# Patient Record
Sex: FEMALE | Race: WHITE | HISPANIC OR LATINO | Employment: FULL TIME | URBAN - METROPOLITAN AREA
[De-identification: names, ages, dates, MRNs, and addresses within clinical notes are randomized per-mention and may not be internally consistent; named-entity substitution may affect disease eponyms.]

---

## 2018-09-15 ENCOUNTER — HOSPITAL ENCOUNTER (EMERGENCY)
Facility: HOSPITAL | Age: 33
Discharge: HOME | End: 2018-09-16
Attending: EMERGENCY MEDICINE

## 2018-09-15 ENCOUNTER — APPOINTMENT (EMERGENCY)
Dept: RADIOLOGY | Facility: HOSPITAL | Age: 33
End: 2018-09-15

## 2018-09-15 VITALS
HEART RATE: 71 BPM | WEIGHT: 182 LBS | SYSTOLIC BLOOD PRESSURE: 115 MMHG | OXYGEN SATURATION: 99 % | HEIGHT: 66 IN | TEMPERATURE: 97.5 F | BODY MASS INDEX: 29.25 KG/M2 | RESPIRATION RATE: 18 BRPM | DIASTOLIC BLOOD PRESSURE: 55 MMHG

## 2018-09-15 DIAGNOSIS — M79.602 LEFT ARM PAIN: ICD-10-CM
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
## 2018-09-15 DIAGNOSIS — R07.89 CHEST WALL PAIN: ICD-10-CM

## 2018-09-15 DIAGNOSIS — N39.0 UTI (URINARY TRACT INFECTION): ICD-10-CM

## 2018-09-15 DIAGNOSIS — M54.9 BACK PAIN: ICD-10-CM

## 2018-09-15 DIAGNOSIS — M79.605 LEFT LEG PAIN: ICD-10-CM

## 2018-09-15 LAB
B-HCG SERPL-ACNC: <2 MIU/ML
BACTERIA UR QL AUTO: ABNORMAL /HPF
BILIRUB UR QL STRIP: NEGATIVE
CLARITY UR: CLEAR
COLOR UR: YELLOW
EXT PREG TEST URINE: ABNORMAL
GLUCOSE UR STRIP-MCNC: NEGATIVE MG/DL
HGB UR QL STRIP.AUTO: ABNORMAL
HYALINE CASTS #/AREA URNS LPF: ABNORMAL /LPF
KETONES UR STRIP-MCNC: ABNORMAL MG/DL
LEUKOCYTE ESTERASE UR QL STRIP: NEGATIVE
NITRITE UR QL STRIP: POSITIVE
NON-SQ EPI CELLS URNS QL MICRO: ABNORMAL /HPF
PH UR STRIP.AUTO: 5.5 [PH] (ref 4.5–8)
PROT UR STRIP-MCNC: ABNORMAL MG/DL
RBC #/AREA URNS AUTO: ABNORMAL /HPF
SP GR UR STRIP.AUTO: 1.02 (ref 1–1.03)
UROBILINOGEN UR QL STRIP.AUTO: 1 E.U./DL
WBC #/AREA URNS AUTO: ABNORMAL /HPF

## 2018-09-15 PROCEDURE — 93005 ELECTROCARDIOGRAM TRACING: CPT

## 2018-09-15 PROCEDURE — 81025 URINE PREGNANCY TEST: CPT | Performed by: EMERGENCY MEDICINE

## 2018-09-15 PROCEDURE — 72070 X-RAY EXAM THORAC SPINE 2VWS: CPT

## 2018-09-15 PROCEDURE — 71046 X-RAY EXAM CHEST 2 VIEWS: CPT

## 2018-09-15 PROCEDURE — 36415 COLL VENOUS BLD VENIPUNCTURE: CPT | Performed by: EMERGENCY MEDICINE

## 2018-09-15 PROCEDURE — 72100 X-RAY EXAM L-S SPINE 2/3 VWS: CPT

## 2018-09-15 PROCEDURE — 84702 CHORIONIC GONADOTROPIN TEST: CPT | Performed by: EMERGENCY MEDICINE

## 2018-09-15 PROCEDURE — 72125 CT NECK SPINE W/O DYE: CPT

## 2018-09-15 PROCEDURE — 81001 URINALYSIS AUTO W/SCOPE: CPT

## 2018-09-15 PROCEDURE — 70450 CT HEAD/BRAIN W/O DYE: CPT

## 2018-09-15 RX ORDER — KETOROLAC TROMETHAMINE 30 MG/ML
15 INJECTION, SOLUTION INTRAMUSCULAR; INTRAVENOUS ONCE
Status: COMPLETED | OUTPATIENT
Start: 2018-09-15 | End: 2018-09-16

## 2018-09-15 RX ORDER — KETOROLAC TROMETHAMINE 30 MG/ML
15 INJECTION, SOLUTION INTRAMUSCULAR; INTRAVENOUS ONCE
Status: DISCONTINUED | OUTPATIENT
Start: 2018-09-15 | End: 2018-09-15

## 2018-09-15 RX ORDER — NITROFURANTOIN 25; 75 MG/1; MG/1
100 CAPSULE ORAL 2 TIMES DAILY
Qty: 10 CAPSULE | Refills: 0 | Status: SHIPPED | OUTPATIENT
Start: 2018-09-15 | End: 2018-09-20

## 2018-09-16 LAB
ATRIAL RATE: 88 BPM
P AXIS: 58 DEGREES
PR INTERVAL: 144 MS
QRS AXIS: 60 DEGREES
QRSD INTERVAL: 82 MS
QT INTERVAL: 356 MS
QTC INTERVAL: 430 MS
T WAVE AXIS: 42 DEGREES
VENTRICULAR RATE: 88 BPM

## 2018-09-16 PROCEDURE — 96372 THER/PROPH/DIAG INJ SC/IM: CPT

## 2018-09-16 PROCEDURE — 93010 ELECTROCARDIOGRAM REPORT: CPT | Performed by: INTERNAL MEDICINE

## 2018-09-16 PROCEDURE — 99284 EMERGENCY DEPT VISIT MOD MDM: CPT

## 2018-09-16 RX ADMIN — KETOROLAC TROMETHAMINE 15 MG: 30 INJECTION, SOLUTION INTRAMUSCULAR at 00:01
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
## 2018-09-16 NOTE — ED NOTES
Spoke with Resident Garlan Osgood about pts positive preg test and asked if she would like a f/u HCG testing  Resident will speak with attending and inform nursing staff of the answer       Wing Hickman RN  09/15/18 3702

## 2018-12-28 ENCOUNTER — ANNUAL EXAM (OUTPATIENT)
Dept: OBGYN CLINIC | Facility: CLINIC | Age: 33
End: 2018-12-28
Payer: COMMERCIAL

## 2018-12-28 VITALS
WEIGHT: 180 LBS | BODY MASS INDEX: 28.93 KG/M2 | SYSTOLIC BLOOD PRESSURE: 112 MMHG | HEIGHT: 66 IN | DIASTOLIC BLOOD PRESSURE: 68 MMHG

## 2018-12-28 DIAGNOSIS — Z01.419 WOMEN'S ANNUAL ROUTINE GYNECOLOGICAL EXAMINATION: Primary | ICD-10-CM

## 2018-12-28 DIAGNOSIS — R10.2 PELVIC PAIN: ICD-10-CM

## 2018-12-28 PROCEDURE — 87624 HPV HI-RISK TYP POOLED RSLT: CPT | Performed by: NURSE PRACTITIONER

## 2018-12-28 PROCEDURE — G0145 SCR C/V CYTO,THINLAYER,RESCR: HCPCS | Performed by: NURSE PRACTITIONER

## 2018-12-28 PROCEDURE — 87480 CANDIDA DNA DIR PROBE: CPT | Performed by: NURSE PRACTITIONER

## 2018-12-28 PROCEDURE — 87660 TRICHOMONAS VAGIN DIR PROBE: CPT | Performed by: NURSE PRACTITIONER

## 2018-12-28 PROCEDURE — 99385 PREV VISIT NEW AGE 18-39: CPT | Performed by: NURSE PRACTITIONER

## 2018-12-28 PROCEDURE — 87510 GARDNER VAG DNA DIR PROBE: CPT | Performed by: NURSE PRACTITIONER

## 2018-12-28 RX ORDER — RANITIDINE 150 MG/1
TABLET ORAL 2 TIMES DAILY
COMMUNITY
Start: 2014-12-09 | End: 2019-03-11 | Stop reason: SDUPTHER

## 2018-12-28 RX ORDER — PHENTERMINE HYDROCHLORIDE 37.5 MG/1
37.5 CAPSULE ORAL EVERY MORNING
COMMUNITY
End: 2019-06-12 | Stop reason: ALTCHOICE

## 2018-12-28 RX ORDER — FLUTICASONE PROPIONATE 50 MCG
2 SPRAY, SUSPENSION (ML) NASAL DAILY
COMMUNITY
Start: 2014-12-09 | End: 2019-03-11

## 2018-12-28 NOTE — PROGRESS NOTES
Subjective  HPI:     Elayne Busby is a 29 y o  female  She is a  4 Para 3, with 3 prior  section  Her menstrual cycles are regular and predictable, heavy with severe cramping  She takes Naproxen 500 mg  This is effective  Her current method of contraception includes tubal ligation  She complains of pelvic pain with intercourse and when she is not sexually active  She denies /GI and Gyn complaints  She complains of depression/anxiety which she manages by keeping her self active  Medical, surgical and family history reviewed  Her dental care is up-to-date  She eats a healthy diet and exercises regularly  She has lost about 30 lbs on phentermine  She is happy with her weight  Gynecologic History    No LMP recorded  Last Pap: 10/2017  Results were: normal - per patient  Obstetric History    OB History    Para Term  AB Living   4 3     1 3   SAB TAB Ectopic Multiple Live Births     1     3      # Outcome Date GA Lbr Luciano/2nd Weight Sex Delivery Anes PTL Lv   4 TAB            3 Para            2 Para            1 Para                   The following portions of the patient's history were reviewed and updated as appropriate: allergies, current medications, past family history, past medical history, past social history, past surgical history and problem list     Review of Systems    Pertinent items are noted in HPI  Objective    Physical Exam   Constitutional: Vital signs are normal  She appears well-developed and well-nourished  Genitourinary: Vagina normal and uterus normal  Pelvic exam was performed with patient supine  There is no rash, tenderness, lesion or Bartholin's cyst on the right labia  There is no rash, tenderness, lesion or Bartholin's cyst on the left labia  Right adnexum does not display mass, does not display tenderness and does not display fullness  Left adnexum does not display mass, does not display tenderness and does not display fullness     Cervix is not parous  Cervix does not exhibit motion tenderness, lesion, discharge, friability, polyp or nabothian cyst      Uterus is anteverted  HENT:   Head: Normocephalic and atraumatic  Neck: Neck supple  No thyromegaly present  Cardiovascular: Normal rate, regular rhythm, S1 normal, S2 normal and normal heart sounds  Pulmonary/Chest: Effort normal and breath sounds normal  Right breast exhibits no inverted nipple, no mass, no nipple discharge, no skin change and no tenderness  Left breast exhibits no inverted nipple, no mass, no nipple discharge, no skin change and no tenderness  Abdominal: Soft  Normal appearance and bowel sounds are normal  She exhibits no distension and no mass  There is tenderness (on bimanual palpation  ) in the suprapubic area  There is no guarding  Lymphadenopathy:     She has no cervical adenopathy  She has no axillary adenopathy  Neurological: She is alert  Skin: Skin is warm  Psychiatric: She has a normal mood and affect  Nursing note and vitals reviewed  Assessment and Plan    Gómez was seen today for gynecologic exam     Diagnoses and all orders for this visit:    Women's annual routine gynecological examination  -     Liquid-based pap, screening  -     VAGINOSIS DNA PROBE (AFFIRM)    Pelvic pain  -     US pelvis complete non OB; Future      Patient informed of a Stable GYN exam  A pap smear was performed  I have discussed the importance of exercise and healthy diet as well as adequate intake of calcium and vitamin D  The current ASCCP guidelines were reviewed  The current ASCCP guidelines were reviewed  The low risk patient will receive pap smear screening every 3 years until the age of 34 and then every 3 to 5 years with HPV co-testing from the ages of 33-67  I emphasized the importance of an annual pelvic and breast exam  A yearly mammogram is recommended for breast cancer screening starting at age 36       Will call patient with results when they are available  All questions have been answered to her satisfaction  Follow up in: 1 year or sooner if needed

## 2018-12-30 LAB
CANDIDA RRNA VAG QL PROBE: NEGATIVE
G VAGINALIS RRNA GENITAL QL PROBE: POSITIVE
T VAGINALIS RRNA GENITAL QL PROBE: NEGATIVE

## 2018-12-31 ENCOUNTER — TELEPHONE (OUTPATIENT)
Dept: OBGYN CLINIC | Facility: CLINIC | Age: 33
End: 2018-12-31

## 2018-12-31 DIAGNOSIS — B96.89 BACTERIAL VAGINOSIS: Primary | ICD-10-CM

## 2018-12-31 DIAGNOSIS — N76.0 BACTERIAL VAGINOSIS: Primary | ICD-10-CM

## 2018-12-31 RX ORDER — METRONIDAZOLE 500 MG/1
500 TABLET ORAL EVERY 12 HOURS SCHEDULED
Qty: 14 TABLET | Refills: 0 | Status: SHIPPED | OUTPATIENT
Start: 2018-12-31 | End: 2019-01-07

## 2018-12-31 NOTE — TELEPHONE ENCOUNTER
Patient informed of + BV on culture  Flagyl sent to pharmacy  All questions and concerns addressed and patient verbalized understanding

## 2019-01-01 LAB
HPV HR 12 DNA CVX QL NAA+PROBE: NEGATIVE
HPV16 DNA CVX QL NAA+PROBE: NEGATIVE
HPV18 DNA CVX QL NAA+PROBE: NEGATIVE

## 2019-01-02 LAB
LAB AP GYN PRIMARY INTERPRETATION: NORMAL
Lab: NORMAL

## 2019-01-04 ENCOUNTER — TRANSCRIBE ORDERS (OUTPATIENT)
Dept: RADIOLOGY | Facility: HOSPITAL | Age: 34
End: 2019-01-04

## 2019-01-04 ENCOUNTER — HOSPITAL ENCOUNTER (OUTPATIENT)
Dept: RADIOLOGY | Facility: HOSPITAL | Age: 34
Discharge: HOME | End: 2019-01-04
Payer: COMMERCIAL

## 2019-01-04 DIAGNOSIS — R10.2 PELVIC PAIN: ICD-10-CM

## 2019-01-04 PROCEDURE — 76830 TRANSVAGINAL US NON-OB: CPT

## 2019-01-04 PROCEDURE — 76856 US EXAM PELVIC COMPLETE: CPT

## 2019-01-18 ENCOUNTER — TELEPHONE (OUTPATIENT)
Dept: OBGYN CLINIC | Facility: CLINIC | Age: 34
End: 2019-01-18

## 2019-02-17 ENCOUNTER — HOSPITAL ENCOUNTER (EMERGENCY)
Facility: HOSPITAL | Age: 34
Discharge: HOME | End: 2019-02-17
Attending: EMERGENCY MEDICINE | Admitting: EMERGENCY MEDICINE
Payer: COMMERCIAL

## 2019-02-17 VITALS
OXYGEN SATURATION: 97 % | HEART RATE: 96 BPM | DIASTOLIC BLOOD PRESSURE: 60 MMHG | RESPIRATION RATE: 18 BRPM | SYSTOLIC BLOOD PRESSURE: 117 MMHG | TEMPERATURE: 98.9 F

## 2019-02-17 DIAGNOSIS — R68.89 FLU-LIKE SYMPTOMS: Primary | ICD-10-CM

## 2019-02-17 DIAGNOSIS — B34.9 VIRAL SYNDROME: ICD-10-CM

## 2019-02-17 LAB — EXT PREG TEST URINE: NEGATIVE

## 2019-02-17 PROCEDURE — 99283 EMERGENCY DEPT VISIT LOW MDM: CPT

## 2019-02-17 PROCEDURE — 81025 URINE PREGNANCY TEST: CPT | Performed by: EMERGENCY MEDICINE

## 2019-02-17 RX ORDER — ONDANSETRON 4 MG/1
4 TABLET, ORALLY DISINTEGRATING ORAL ONCE
Status: COMPLETED | OUTPATIENT
Start: 2019-02-17 | End: 2019-02-17

## 2019-02-17 RX ORDER — OXYMETAZOLINE HYDROCHLORIDE 0.05 G/100ML
2 SPRAY NASAL ONCE
Status: COMPLETED | OUTPATIENT
Start: 2019-02-17 | End: 2019-02-17

## 2019-02-17 RX ORDER — ONDANSETRON 4 MG/1
4 TABLET, ORALLY DISINTEGRATING ORAL EVERY 6 HOURS PRN
Qty: 20 TABLET | Refills: 0 | Status: SHIPPED | OUTPATIENT
Start: 2019-02-17 | End: 2019-10-31 | Stop reason: ALTCHOICE

## 2019-02-17 RX ORDER — IBUPROFEN 600 MG/1
600 TABLET ORAL ONCE
Status: COMPLETED | OUTPATIENT
Start: 2019-02-17 | End: 2019-02-17

## 2019-02-17 RX ADMIN — ONDANSETRON 4 MG: 4 TABLET, ORALLY DISINTEGRATING ORAL at 15:45

## 2019-02-17 RX ADMIN — OXYMETAZOLINE HYDROCHLORIDE 2 SPRAY: 0.05 SPRAY NASAL at 15:44

## 2019-02-17 RX ADMIN — IBUPROFEN 600 MG: 600 TABLET ORAL at 15:45

## 2019-02-17 NOTE — ED PROVIDER NOTES
History  Chief Complaint   Patient presents with    Flu Symptoms     Pt reports 2 days of cough, nasal congestion, ear pain, sore throat and subjective fever, started yesterday with vomiting and diarrhea     This is a 81-year-old female with a history of asthma who presents with viral URI type symptoms  Starting Friday, she began experiencing runny nose, cough, subjective fevers, sore throat  She has taken Mucinex and Tylenol without relief  Over the past couple days she has also experienced 4 episodes of nonbloody, nonmelanotic diarrhea  Starting today, she had 2 episodes of nonbloody, nonbilious vomiting  States that she has been around a few family members with similar symptoms  Unsure if she received the flu vaccine this year  Denies lightheadedness/dizziness, numbness/weakness, headache, change in vision, neck pain, chest pain, palpitations, shortness of breath, back pain, flank pain, abdominal pain,  hematochezia, melena, dysuria, hematuria, abnormal vaginal discharge/bleeding  Prior to Admission Medications   Prescriptions Last Dose Informant Patient Reported?  Taking?   fluticasone (FLONASE) 50 mcg/act nasal spray   Yes No   Si sprays into each nostril daily   phentermine 37 5 MG capsule  Self Yes No   Sig: Take 37 5 mg by mouth every morning   ranitidine (ZANTAC) 150 mg tablet   Yes No   Sig: Take by mouth 2 (two) times a day      Facility-Administered Medications: None       Past Medical History:   Diagnosis Date    Asthma     Migraine        Past Surgical History:   Procedure Laterality Date     SECTION      x 3    TUBAL LIGATION         Family History   Problem Relation Age of Onset    Asthma Mother    Jessenia Courser Migraines Mother     Osteoarthritis Mother     No Known Problems Father     No Known Problems Sister     No Known Problems Brother     Asthma Daughter     Asthma Son     Diabetes Maternal Grandmother     Heart failure Maternal Grandmother     Arthritis Maternal Grandmother      I have reviewed and agree with the history as documented  Social History     Tobacco Use    Smoking status: Never Smoker    Smokeless tobacco: Never Used   Substance Use Topics    Alcohol use: Yes     Comment: social    Drug use: No        Review of Systems   Constitutional: Positive for fever  Negative for chills  HENT: Positive for congestion, rhinorrhea and sore throat  Negative for trouble swallowing  Eyes: Negative for photophobia and visual disturbance  Respiratory: Positive for cough  Negative for chest tightness and shortness of breath  Cardiovascular: Negative for chest pain, palpitations and leg swelling  Gastrointestinal: Positive for diarrhea, nausea and vomiting  Negative for abdominal pain and blood in stool  Endocrine: Negative for polyuria  Genitourinary: Negative for dysuria, flank pain, hematuria, vaginal bleeding and vaginal discharge  Musculoskeletal: Negative for back pain and neck pain  Skin: Negative for color change and rash  Allergic/Immunologic: Negative for immunocompromised state  Neurological: Negative for dizziness, weakness, light-headedness, numbness and headaches  All other systems reviewed and are negative  Physical Exam  ED Triage Vitals   Temperature Pulse Respirations Blood Pressure SpO2   02/17/19 1401 02/17/19 1401 02/17/19 1401 02/17/19 1401 02/17/19 1401   98 9 °F (37 2 °C) 96 18 117/60 97 %      Temp Source Heart Rate Source Patient Position - Orthostatic VS BP Location FiO2 (%)   02/17/19 1401 02/17/19 1401 02/17/19 1401 02/17/19 1401 --   Tympanic Monitor Sitting Left arm       Pain Score       02/17/19 1402       9           Orthostatic Vital Signs  Vitals:    02/17/19 1401   BP: 117/60   Pulse: 96   Patient Position - Orthostatic VS: Sitting       Physical Exam   Constitutional: Vital signs are normal  She appears well-developed  She is cooperative  No distress  HENT:   Nose: Rhinorrhea present     Mouth/Throat: Uvula is midline, oropharynx is clear and moist and mucous membranes are normal  No tonsillar exudate  Eyes: Pupils are equal, round, and reactive to light  Conjunctivae and EOM are normal    Neck: Trachea normal  No thyroid mass and no thyromegaly present  Cardiovascular: Normal rate, regular rhythm, normal heart sounds, intact distal pulses and normal pulses  No murmur heard  Pulmonary/Chest: Effort normal and breath sounds normal    Abdominal: Soft  Normal appearance and bowel sounds are normal  There is no tenderness  There is no rebound, no guarding and no CVA tenderness  Neurological: She is alert  Skin: Skin is warm, dry and intact  Psychiatric: She has a normal mood and affect  Her speech is normal and behavior is normal  Thought content normal        ED Medications  Medications   oxymetazoline (AFRIN) 0 05 % nasal spray 2 spray (2 sprays Each Nare Given 2/17/19 1544)   ondansetron (ZOFRAN-ODT) dispersible tablet 4 mg (4 mg Oral Given 2/17/19 1545)   ibuprofen (MOTRIN) tablet 600 mg (600 mg Oral Given 2/17/19 1545)       Diagnostic Studies  Results Reviewed     Procedure Component Value Units Date/Time    POCT pregnancy, urine [[id number]]  (Normal) Resulted:  02/17/19 1521    Lab Status:  Final result Updated:  02/17/19 1521     EXT PREG TEST UR (Ref: Negative) negative                 No orders to display         Procedures  Procedures      Phone Consults  ED Phone Contact    ED Course                               MDM  Number of Diagnoses or Management Options  Diagnosis management comments: The patient has a viral URI  Will treat symptomatically with Afrin, Zofran, and Motrin  Work note for the next couple of days  Strict return precautions given        Disposition  Final diagnoses:   Flu-like symptoms   Viral syndrome     Time reflects when diagnosis was documented in both MDM as applicable and the Disposition within this note     Time User Action Codes Description Comment    2/17/2019 3:40 PM Ilya Pugh Add [R68 89] Flu-like symptoms     2/17/2019  3:40 PM Robertia Cora BEAR Add [B34 9] Viral syndrome       ED Disposition     ED Disposition Condition Date/Time Comment    Discharge Stable Sun Feb 17, 2019  3:40 PM Blade Steinberg discharge to home/self care  Follow-up Information     Follow up With Specialties Details Why Contact Info Additional Information    Mauro Garcia MD Family Medicine Schedule an appointment as soon as possible for a visit  As needed [place] [phone]       [address] [place] [address] Emergency Department Emergency Medicine Go to  If symptoms worsen [address]  [phone]  ED, [address] [place] [address], [facility]          Patient's Medications   Discharge Prescriptions    ONDANSETRON (ZOFRAN-ODT) 4 MG DISINTEGRATING TABLET    Take 1 tablet (4 mg total) by mouth every 6 (six) hours as needed for nausea or vomiting       Start Date: 2/17/2019 End Date: --       Order Dose: 4 mg       Quantity: 20 tablet    Refills: 0     No discharge procedures on file  ED Provider  Attending physically available and evaluated Blade Steinberg I managed the patient along with the ED Attending      Electronically Signed by         Judge Rosalio MD  02/17/19 6018

## 2019-02-17 NOTE — ED ATTENDING ATTESTATION
Abigail Sauer DO, saw and evaluated the patient  I have discussed the patient with the resident/non-physician practitioner and agree with the resident's/non-physician practitioner's findings, Plan of Care, and MDM as documented in the resident's/non-physician practitioner's note, except where noted  All available labs and Radiology studies were reviewed  At this point I agree with the current assessment done in the Emergency Department  I have conducted an independent evaluation of this patient a history and physical is as follows:      28 yo female c/o subjective fever, rhinorrhea, cough, sore throat, n/v/d over past few days  +sick contacts  Symptoms generalized, moderate severity, no a/e factors  Unsure if she had a flu vaccine this year       Imp: multiple c/o likely viral URI plan: symptomatic tx    Critical Care Time  Procedures

## 2019-03-11 ENCOUNTER — OFFICE VISIT (OUTPATIENT)
Dept: INTERNAL MEDICINE CLINIC | Facility: CLINIC | Age: 34
End: 2019-03-11

## 2019-03-11 VITALS
SYSTOLIC BLOOD PRESSURE: 110 MMHG | TEMPERATURE: 97.7 F | HEIGHT: 65 IN | WEIGHT: 182.98 LBS | HEART RATE: 100 BPM | BODY MASS INDEX: 30.49 KG/M2 | DIASTOLIC BLOOD PRESSURE: 76 MMHG

## 2019-03-11 DIAGNOSIS — J45.30 MILD PERSISTENT ASTHMA WITHOUT COMPLICATION: ICD-10-CM

## 2019-03-11 DIAGNOSIS — M54.50 CHRONIC BILATERAL LOW BACK PAIN WITHOUT SCIATICA: Primary | ICD-10-CM

## 2019-03-11 DIAGNOSIS — G89.29 CHRONIC BILATERAL LOW BACK PAIN WITHOUT SCIATICA: Primary | ICD-10-CM

## 2019-03-11 DIAGNOSIS — F41.9 ANXIETY: ICD-10-CM

## 2019-03-11 DIAGNOSIS — R10.13 DYSPEPSIA: ICD-10-CM

## 2019-03-11 DIAGNOSIS — E66.09 CLASS 1 OBESITY DUE TO EXCESS CALORIES WITHOUT SERIOUS COMORBIDITY WITH BODY MASS INDEX (BMI) OF 30.0 TO 30.9 IN ADULT: ICD-10-CM

## 2019-03-11 DIAGNOSIS — J30.89 SEASONAL ALLERGIC RHINITIS DUE TO OTHER ALLERGIC TRIGGER: ICD-10-CM

## 2019-03-11 PROBLEM — E66.811 CLASS 1 OBESITY DUE TO EXCESS CALORIES WITHOUT SERIOUS COMORBIDITY WITH BODY MASS INDEX (BMI) OF 30.0 TO 30.9 IN ADULT: Status: ACTIVE | Noted: 2019-03-11

## 2019-03-11 PROCEDURE — 99204 OFFICE O/P NEW MOD 45 MIN: CPT | Performed by: INTERNAL MEDICINE

## 2019-03-11 PROCEDURE — 3725F SCREEN DEPRESSION PERFORMED: CPT | Performed by: INTERNAL MEDICINE

## 2019-03-11 RX ORDER — RANITIDINE 150 MG/1
75 TABLET ORAL 2 TIMES DAILY PRN
Qty: 60 TABLET | Refills: 0 | Status: SHIPPED | OUTPATIENT
Start: 2019-03-11 | End: 2021-06-17

## 2019-03-11 RX ORDER — ALBUTEROL SULFATE 90 UG/1
2 AEROSOL, METERED RESPIRATORY (INHALATION) EVERY 6 HOURS PRN
Qty: 18 G | Refills: 0 | Status: SHIPPED | OUTPATIENT
Start: 2019-03-11 | End: 2019-10-31 | Stop reason: SDUPTHER

## 2019-03-11 RX ORDER — LORATADINE 10 MG/1
10 TABLET ORAL DAILY
Qty: 90 TABLET | Refills: 1 | Status: SHIPPED | OUTPATIENT
Start: 2019-03-11 | End: 2019-09-07

## 2019-03-11 RX ORDER — METHOCARBAMOL 750 MG/1
750 TABLET, FILM COATED ORAL 2 TIMES DAILY PRN
Qty: 30 TABLET | Refills: 0 | Status: SHIPPED | OUTPATIENT
Start: 2019-03-11 | End: 2021-01-27

## 2019-03-11 RX ORDER — FLUTICASONE PROPIONATE 50 MCG
1 SPRAY, SUSPENSION (ML) NASAL DAILY
Qty: 1 BOTTLE | Refills: 1 | Status: SHIPPED | OUTPATIENT
Start: 2019-03-11 | End: 2020-02-19 | Stop reason: ALTCHOICE

## 2019-03-11 NOTE — PROGRESS NOTES
Assessment/Plan:    No problem-specific Assessment & Plan notes found for this encounter  Diagnoses and all orders for this visit:    Chronic bilateral low back pain without sciatica  -     methocarbamol (ROBAXIN) 750 mg tablet; Take 1 tablet (750 mg total) by mouth 2 (two) times a day as needed for muscle spasms for up to 15 days  -     XR entire spine (scoliosis) 4-5 vw; Future    Mild persistent asthma without complication  -     mometasone-formoterol (DULERA) 100-5 MCG/ACT inhaler; Inhale 2 puffs 2 (two) times a day for 90 days Rinse mouth after use  -     albuterol (VENTOLIN HFA) 90 mcg/act inhaler; Inhale 2 puffs every 6 (six) hours as needed for wheezing  -     loratadine (CLARITIN) 10 mg tablet; Take 1 tablet (10 mg total) by mouth daily for 180 days    Seasonal allergic rhinitis due to other allergic trigger  -     loratadine (CLARITIN) 10 mg tablet; Take 1 tablet (10 mg total) by mouth daily for 180 days  -     fluticasone (FLONASE) 50 mcg/act nasal spray; 1 spray into each nostril daily for 240 days    Anxiety  -     TSH, 3rd generation with Free T4 reflex    Class 1 obesity due to excess calories without serious comorbidity with body mass index (BMI) of 30 0 to 30 9 in adult  -     Comprehensive metabolic panel  -     Lipid Panel with Direct LDL reflex  -     Ambulatory referral to Weight Management; Future    Dyspepsia  -     ranitidine (ZANTAC) 150 mg tablet; Take 0 5 tablets (75 mg total) by mouth 2 (two) times a day as needed for heartburn for up to 30 days          Subjective:      Patient ID: Víctor Rogers is a 29 y o  female  New patient to establish care  Asthma since child only has a rescue using it every day  Also chronic allergies    Last PCP was in [place], was being given diet pill and gabapentin for back pain  Just ran out of meds            Back Pain   This is a chronic problem  The current episode started more than 1 year ago  The problem occurs every several days   The problem has been gradually worsening since onset  The pain is present in the lumbar spine  The quality of the pain is described as aching  The pain does not radiate  The pain is at a severity of 7/10  The pain is moderate  The pain is worse during the day  The symptoms are aggravated by bending and standing  Pertinent negatives include no abdominal pain, bladder incontinence, bowel incontinence, chest pain, dysuria, fever, leg pain, numbness, paresthesias, tingling or weakness  She has tried NSAIDs (motrin helps for a little while) for the symptoms  The treatment provided mild relief  Asthma   She complains of shortness of breath  There is no cough, hoarse voice or wheezing  This is a chronic problem  The current episode started more than 1 year ago  The problem occurs every several days  The problem has been gradually worsening  Associated symptoms include heartburn, myalgias, nasal congestion, postnasal drip, rhinorrhea and sneezing  Pertinent negatives include no chest pain, ear congestion, ear pain, fever, sore throat or trouble swallowing  Her symptoms are aggravated by change in weather, pollen and URI  Her symptoms are alleviated by beta-agonist  She reports moderate improvement on treatment  Her past medical history is significant for asthma  Heartburn   She complains of heartburn  She reports no abdominal pain, no chest pain, no choking, no coughing, no dysphagia, no hoarse voice, no nausea, no sore throat or no wheezing  This is a chronic problem  The current episode started more than 1 year ago  The problem occurs frequently  The problem has been gradually worsening  The heartburn duration is an hour  The heartburn is located in the substernum and LUQ  The heartburn is of moderate intensity  The heartburn does not wake her from sleep  The heartburn does not limit her activity  The heartburn doesn't change with position  Pertinent negatives include no fatigue  Risk factors include NSAIDs and lack of exercise  She has tried a histamine-2 antagonist for the symptoms  The treatment provided moderate relief  The following portions of the patient's history were reviewed and updated as appropriate: allergies, current medications, past family history, past medical history, past social history, past surgical history and problem list     Review of Systems   Constitutional: Negative  Negative for chills, fatigue, fever and unexpected weight change  HENT: Positive for congestion, postnasal drip, rhinorrhea and sneezing  Negative for dental problem, ear pain, hoarse voice, sore throat and trouble swallowing  Dentist 2018   Eyes: Negative  No correction   Respiratory: Positive for shortness of breath  Negative for cough, choking, chest tightness and wheezing  Cardiovascular: Negative  Negative for chest pain, palpitations and leg swelling  Gastrointestinal: Positive for heartburn  Negative for abdominal pain, bowel incontinence, dysphagia and nausea  Endocrine: Negative  Genitourinary: Negative  Negative for bladder incontinence and dysuria  3 children /  states birth canal too narrow  2 abortions    No gestational DM  Elevated BP all 3 was on BP meds with second child    Last PAP 2018   Musculoskeletal: Positive for back pain and myalgias  Fractured R arm small child/ cast   Skin: Negative  Negative for rash  No eczema or psoriasis   Allergic/Immunologic: Positive for environmental allergies  Neurological: Negative  Negative for tingling, seizures, weakness, numbness and paresthesias  Psychiatric/Behavioral: The patient is nervous/anxious  Used to see MH has not seen in about 5 years    Would like to go back  Feeling more anxious and also states has OCD         Objective:      /76 (BP Location: Left arm, Patient Position: Sitting, Cuff Size: Standard)   Pulse 100   Temp 97 7 °F (36 5 °C) (Oral)   Ht 5' 5" (1 651 m)   Wt 83 kg (182 lb 15 7 oz)   LMP 02/10/2019 (Approximate)   BMI 30 45 kg/m²          Physical Exam   Constitutional: She is oriented to person, place, and time  She appears well-developed and well-nourished  HENT:   Head: Normocephalic and atraumatic  Left Ear: External ear normal    Mouth/Throat: Oropharynx is clear and moist    Eyes: Pupils are equal, round, and reactive to light  Conjunctivae and EOM are normal    Neck: Normal range of motion  Neck supple  No thyromegaly present  Cardiovascular: Normal rate and regular rhythm  No murmur heard  Pulmonary/Chest: Effort normal and breath sounds normal  She has no wheezes  Abdominal: Soft  Bowel sounds are normal  There is no tenderness  Musculoskeletal: She exhibits tenderness  Lumbar back: She exhibits decreased range of motion, tenderness and deformity  She exhibits no bony tenderness  On physical exam it is noticed that patient's stands more with weight on right hip  Patient states she feels that she is always done this and feels that it is more comfortable  When asked about her spine she states she was supposed to have a brace as a child but never had 1  On exam hard to tell if there is a definitive scoliosis as patient continuously has right hip more lateral    Neurological: She is alert and oriented to person, place, and time  She displays normal reflexes  No sensory deficit  Skin: Skin is warm and dry  No rash noted  Tattoos lower back   Psychiatric: She has a normal mood and affect   Her behavior is normal  Judgment and thought content normal        PHQ-9 Depression Screening    PHQ-9:    Frequency of the following problems over the past two weeks:       Little interest or pleasure in doing things:  2 - more than half the days  Feeling down, depressed, or hopeless:  1 - several days  Trouble falling or staying asleep, or sleeping too much:  3 - nearly every day  Feeling tired or having little energy:  3 - nearly every day  Poor appetite or overeating: 2 - more than half the days  Feeling bad about yourself - or that you are a failure or have let yourself or your family down:  2 - more than half the days  Trouble concentrating on things, such as reading the newspaper or watching television:  2 - more than half the days  Moving or speaking so slowly that other people could have noticed  Or the opposite - being so fidgety or restless that you have been moving around a lot more than usual:  1 - several days  Thoughts that you would be better off dead, or of hurting yourself in some way:  0 - not at all  PHQ-2 Score:  3  PHQ-9 Score:  16       BMI Counseling: Body mass index is 30 45 kg/m²  Discussed the patient's BMI with her  The BMI is above average  BMI counseling and education was provided to the patient  Nutrition recommendations include reducing portion sizes, 3-5 servings of fruits/vegetables daily, reducing fast food intake, decreasing soda and/or juice intake and moderation in carbohydrate intake  Exercise recommendations include exercising 3-5 times per week

## 2019-03-11 NOTE — PATIENT INSTRUCTIONS
Please get the labs completed  As discussed we are starting you on a daily inhaler  This is because your asthma is not controlled  We reviewed that using a rescue inhaler 1-2 times every day means asthma is not well controlled  Please start the [facility]  This is twice a day every day as reviewed and please rinse her mouth after using  Allergy medications were also sent  I sent a medication for muscle spasms in her back  Please get the x-ray completed as you may have scoliosis and based on this will determine if you need to see a specialist or physical therapy for your chronic back pain  I also refilled her medication for stomach pain but we also reviewed the dietary changes as well as avoiding ibuprofen and Motrin as these also contribute to your stomach pain  Please also read the additional information included about diet and lifestyle for stomach pain  Please contact your insurance to get an appointment with a mental health provider  You plan to contact insurance possibly return to [facility] placed for weight management  At follow-up we will review your asthma as well as get breathing test completed in the office  Diet for Stomach Ulcers and Gastritis   WHAT YOU NEED TO KNOW:   A diet for stomach ulcers and gastritis is a meal plan that limits foods that irritate your stomach  Certain foods may worsen symptoms such as stomach pain, bloating, heartburn, or indigestion  DISCHARGE INSTRUCTIONS:   Foods to limit or avoid:  You may need to avoid acidic, spicy, or high-fat foods  Not all foods affect everyone the same way  You will need to learn which foods worsen your symptoms and limit those foods   The following are some foods that may worsen ulcer or gastritis symptoms:  · Beverages:      ¨ Whole milk and chocolate milk    ¨ Hot cocoa and cola    ¨ Any beverage with caffeine    ¨ Regular and decaffeinated coffee    ¨ Peppermint and spearmint tea    ¨ Green and black tea, with or without caffeine    ¨ Orange and grapefruit juices    ¨ Drinks that contain alcohol    · Spices and seasonings:      ¨ Black and red pepper    ¨ Chili powder    ¨ Mustard seed and nutmeg    · Other foods:      ¨ Dairy foods made from whole milk or cream    ¨ Chocolate    ¨ Spicy or strongly flavored cheeses, such as jalapeno or black pepper    ¨ Highly seasoned, high-fat meats, such as sausage, salami, banks, ham, and cold cuts    ¨ Hot chiles and peppers    ¨ Tomato products, such as tomato paste, tomato sauce, or tomato juice  Foods to include:  Eat a variety of healthy foods from all the food groups  Eat fruits, vegetables, whole grains, and fat-free or low-fat dairy foods  Whole grains include whole-wheat breads, cereals, pasta, and brown rice  Choose lean meats, poultry (chicken and turkey), fish, beans, eggs, and nuts  A healthy meal plan is low in unhealthy fats, salt, and added sugar  Healthy fats include olive oil and canola oil  Ask your dietitian for more information about a healthy diet  Other helpful guidelines:   · Do not eat right before bedtime  Stop eating at least 2 hours before bedtime  · Eat small, frequent meals  Your stomach may tolerate small, frequent meals better than large meals  © 2017 [address] [employer] Information is for End User's use only and may not be sold, redistributed or otherwise used for commercial purposes  All illustrations and images included in CareNotes® are the copyrighted property of A D A M [employer] Inc  or Josr Bowen  The above information is an  only  It is not intended as medical advice for individual conditions or treatments  Talk to your doctor, nurse or pharmacist before following any medical regimen to see if it is safe and effective for you  Obesity   AMBULATORY CARE:   Obesity  is when your body mass index (BMI) is greater than 30  Your healthcare provider will use your height and weight to measure your BMI    The risks of obesity include  many health problems, such as injuries or physical disability  You may need tests to check for the following:  · Diabetes     · High blood pressure or high cholesterol     · Heart disease     · Gallbladder or liver disease     · Cancer of the colon, breast, prostate, liver, or kidney     · Sleep apnea     · Arthritis or gout  Seek care immediately if:   · You have a severe headache, confusion, or difficulty speaking  · You have weakness on one side of your body  · You have chest pain, sweating, or shortness of breath  Contact your healthcare provider if:   · You have symptoms of gallbladder or liver disease, such as pain in your upper abdomen  · You have knee or hip pain and discomfort while walking  · You have symptoms of diabetes, such as intense hunger and thirst, and frequent urination  · You have symptoms of sleep apnea, such as snoring or daytime sleepiness  · You have questions or concerns about your condition or care  Treatment for obesity  focuses on helping you lose weight to improve your health  Even a small decrease in BMI can reduce the risk for many health problems  Your healthcare provider will help you set a weight-loss goal   · Lifestyle changes  are the first step in treating obesity  These include making healthy food choices and getting regular physical activity  Your healthcare provider may suggest a weight-loss program that involves coaching, education, and therapy  · Medicine  may help you lose weight when it is used with a healthy diet and physical activity  · Surgery  can help you lose weight if you are very obese and have other health problems  There are several types of weight-loss surgery  Ask your healthcare provider for more information  Be successful losing weight:   · Set small, realistic goals  An example of a small goal is to walk for 20 minutes 5 days a week  Anther goal is to lose 5% of your body weight      · Tell friends, family members, and coworkers about your goals  and ask for their support  Ask a friend to lose weight with you, or join a weight-loss support group  · Identify foods or triggers that may cause you to overeat , and find ways to avoid them  Remove tempting high-calorie foods from your home and workplace  Place a bowl of fresh fruit on your kitchen counter  If stress causes you to eat, then find other ways to cope with stress  · Keep a diary to track what you eat and drink  Also write down how many minutes of physical activity you do each day  Weigh yourself once a week and record it in your diary  Eating changes: You will need to eat 500 to 1,000 fewer calories each day than you currently eat to lose 1 to 2 pounds a week  The following changes will help you cut calories:  · Eat smaller portions  Use small plates, no larger than 9 inches in diameter  Fill your plate half full of fruits and vegetables  Measure your food using measuring cups until you know what a serving size looks like  · Eat 3 meals and 1 or 2 snacks each day  Plan your meals in advance  Rina Corona and eat at home most of the time  Eat slowly  · Eat fruits and vegetables at every meal   They are low in calories and high in fiber, which makes you feel full  Do not add butter, margarine, or cream sauce to vegetables  Use herbs to season steamed vegetables  · Eat less fat and fewer fried foods  Eat more baked or grilled chicken and fish  These protein sources are lower in calories and fat than red meat  Limit fast food  Dress your salads with olive oil and vinegar instead of bottled dressing  · Limit the amount of sugar you eat  Do not drink sugary beverages  Limit alcohol  Activity changes:  Physical activity is good for your body in many ways  It helps you burn calories and build strong muscles  It decreases stress and depression, and improves your mood  It can also help you sleep better   Talk to your healthcare provider before you begin an exercise program   · Exercise for at least 30 minutes 5 days a week  Start slowly  Set aside time each day for physical activity that you enjoy and that is convenient for you  It is best to do both weight training and an activity that increases your heart rate, such as walking, bicycling, or swimming  · Find ways to be more active  Do yard work and housecleaning  Walk up the stairs instead of using elevators  Spend your leisure time going to events that require walking, such as outdoor festivals or fairs  This extra physical activity can help you lose weight and keep it off  Follow up with your healthcare provider as directed: You may need to meet with a dietitian  Write down your questions so you remember to ask them during your visits  © 2017 [address] [facility] Information is for End User's use only and may not be sold, redistributed or otherwise used for commercial purposes  All illustrations and images included in CareNotes® are the copyrighted property of A D A M , Inc  or Josr Bowen  The above information is an  only  It is not intended as medical advice for individual conditions or treatments  Talk to your doctor, nurse or pharmacist before following any medical regimen to see if it is safe and effective for you  Low Fat Diet   AMBULATORY CARE:   A low-fat diet  is an eating plan that is low in total fat, unhealthy fat, and cholesterol  You may need to follow a low-fat diet if you have trouble digesting or absorbing fat  You may also need to follow this diet if you have high cholesterol  You can also lower your cholesterol by increasing the amount of fiber in your diet  Soluble fiber is a type of fiber that helps to decrease cholesterol levels  Different types of fat in food:   · Limit unhealthy fats  A diet that is high in cholesterol, saturated fat, and trans fat may cause unhealthy cholesterol levels   Unhealthy cholesterol levels increase your risk of heart disease  ¨ Cholesterol:  Limit intake of cholesterol to less than 200 mg per day  Cholesterol is found in meat, eggs, and dairy  ¨ Saturated fat:  Limit saturated fat to less than 7% of your total daily calories  Ask your dietitian how many calories you need each day  Saturated fat is found in butter, cheese, ice cream, whole milk, and palm oil  Saturated fat is also found in meat, such as beef, pork, chicken skin, and processed meats  Processed meats include sausage, hot dogs, and bologna  ¨ Trans fat:  Avoid trans fat as much as possible  Trans fat is used in fried and baked foods  Foods that say trans fat free on the label may still have up to 0 5 grams of trans fat per serving  · Include healthy fats  Replace foods that are high in saturated and trans fat with foods high in healthy fats  This may help to decrease high cholesterol levels  ¨ Monounsaturated fats: These are found in avocados, nuts, and vegetable oils, such as olive, canola, and sunflower oil  ¨ Polyunsaturated fats: These can be found in vegetable oils, such as soybean or corn oil  Omega-3 fats can help to decrease the risk of heart disease  Omega-3 fats are found in fish, such as salmon, herring, trout, and tuna  Omega-3 fats can also be found in plant foods, such as walnuts, flaxseed, soybeans, and canola oil    Foods to limit or avoid:   · Grains:      ¨ Snacks that are made with partially hydrogenated oils, such as chips, regular crackers, and butter-flavored popcorn    ¨ High-fat baked goods, such as biscuits, croissants, doughnuts, pies, cookies, and pastries    · Dairy:      ¨ Whole milk, 2% milk, and yogurt and ice cream made with whole milk    ¨ Half and half creamer, heavy cream, and whipping cream    ¨ Cheese, cream cheese, and sour cream    · Meats and proteins:      ¨ High-fat cuts of meat (T-bone steak, regular hamburger, and ribs)    ¨ Yostro, poultry (turkey and chicken), and fish    [employer] (chicken and turkey) with skin    ¨ Cold cuts (salami or bologna), hot dogs, banks, and sausage    ¨ Whole eggs and egg yolks    · Vegetables and fruits with added fat:      ¨ Fried vegetables or vegetables in butter or high-fat sauces, such as cream or cheese sauces    ¨ Fried fruit or fruit served with butter or cream    · Fats:      ¨ Butter, stick margarine, and shortening    ¨ Coconut, palm oil, and palm kernel oil  Foods to include:   · Grains:      ¨ Whole-grain breads, cereals, pasta, and brown rice    ¨ Low-fat crackers and pretzels    · Vegetables and fruits:      ¨ Fresh, frozen, or canned vegetables (no salt or low-sodium)    ¨ Fresh, frozen, dried, or canned fruit (canned in light syrup or fruit juice)    ¨ Avocado    · Low-fat dairy products:      ¨ Nonfat (skim) or 1% milk    ¨ Nonfat or low-fat cheese, yogurt, and cottage cheese    · Meats and proteins:      ¨ Chicken or turkey with no skin    ¨ Baked or broiled fish    ¨ Lean beef and pork (loin, round, extra lean hamburger)    ¨ Beans and peas, unsalted nuts, soy products    ¨ Egg whites and substitutes    ¨ Seeds and nuts    · Fats:      ¨ Unsaturated oil, such as canola, olive, peanut, soybean, or sunflower oil    ¨ Soft or liquid margarine and vegetable oil spread    ¨ Low-fat salad dressing  Other ways to decrease fat:   · Read food labels before you buy foods  Choose foods that have less than 30% of calories from fat  Choose low-fat or fat-free dairy products  Remember that fat free does not mean calorie free  These foods still contain calories, and too many calories can lead to weight gain  · Trim fat from meat and avoid fried food  Trim all visible fat from meat before you cook it  Remove the skin from poultry  Do not peterson meat, fish, or poultry  Bake, roast, boil, or broil these foods instead  Avoid fried foods  Eat a baked potato instead of Western Ayesha fries  Steam vegetables instead of sautéing them in butter  · Add less fat to foods  Use imitation banks bits on salads and baked potatoes instead of regular banks bits  Use fat-free or low-fat salad dressings instead of regular dressings  Use low-fat or nonfat butter-flavored topping instead of regular butter or margarine on popcorn and other foods  Ways to decrease fat in recipes:  Replace high-fat ingredients with low-fat or nonfat ones  This may cause baked goods to be drier than usual  You may need to use nonfat cooking spray on pans to prevent food from sticking  You also may need to change the amount of other ingredients, such as water, in the recipe  Try the following:  · Use low-fat or light margarine instead of regular margarine or shortening  · Use lean ground turkey breast or chicken, or lean ground beef (less than 5% fat) instead of hamburger  · Add 1 teaspoon of canola oil to 8 ounces of skim milk instead of using cream or half and half  · Use grated zucchini, carrots, or apples in breads instead of coconut  · Use blenderized, low-fat cottage cheese, plain tofu, or low-fat ricotta cheese instead of cream cheese  · Use 1 egg white and 1 teaspoon of canola oil, or use ¼ cup (2 ounces) of fat-free egg substitute instead of a whole egg  · Replace half of the oil that is called for in a recipe with applesauce when you bake  Use 3 tablespoons of cocoa powder and 1 tablespoon of canola oil instead of a square of baking chocolate  How to increase fiber:  Eat enough high-fiber foods to get 20 to 30 grams of fiber every day  Slowly increase your fiber intake to avoid stomach cramps, gas, and other problems  · Eat 3 ounces of whole-grain foods each day  An ounce is about 1 slice of bread  Eat whole-grain breads, such as whole-wheat bread  Whole wheat, whole-wheat flour, or other whole grains should be listed as the first ingredient on the food label  Replace white flour with whole-grain flour or use half of each in recipes   Whole-grain flour is heavier than white flour, so you may have to add more yeast or baking powder  · Eat a high-fiber cereal for breakfast   Oatmeal is a good source of soluble fiber  Look for cereals that have bran or fiber in the name  Choose whole-grain products, such as brown rice, barley, and whole-wheat pasta  · Eat more beans, peas, and lentils  For example, add beans to soups or salads  Eat at least 5 cups of fruits and vegetables each day  Eat fruits and vegetables with the peel because the peel is high in fiber  © 2017 [address] [facility] Information is for End User's use only and may not be sold, redistributed or otherwise used for commercial purposes  All illustrations and images included in CareNotes® are the copyrighted property of [employer] , William  or Josr Bowen  The above information is an  only  It is not intended as medical advice for individual conditions or treatments  Talk to your doctor, nurse or pharmacist before following any medical regimen to see if it is safe and effective for you  Calorie Counting Diet   WHAT YOU NEED TO KNOW:   What is a calorie counting diet? It is a meal plan based on counting calories each day to reach a healthy body weight  You will need to eat fewer calories if you are trying to lose weight  Weight loss may decrease your risk for certain health problems or improve your health if you have health problems  Some of these health problems include heart disease, high blood pressure, and diabetes  What foods should I avoid? Your dietitian will tell you if you need to avoid certain foods based on your body weight and health condition  You may need to avoid high-fat foods if you are at risk for or have heart disease  You may need to eat fewer foods from the breads and starches food group if you have diabetes  How many calories are in foods? The following is a list of foods and drinks with the approximate number of calories in each   Check the food label to find the exact number of calories  A dietitian can tell you how many calories you should have from each food group each day    · Carbohydrate:      ¨ ½ of a 3-inch bagel, 1 slice of bread, or ½ of a hamburger bun or hot dog bun (80)    ¨ 1 (8-inch) flour tortilla or ½ cup of cooked rice (100)    ¨ 1 (6-inch) corn tortilla (80)    ¨ 1 (6-inch) pancake or 1 cup of bran flakes cereal (110)    ¨ ½ cup of cooked cereal (80)    ¨ ½ cup of cooked pasta (85)    ¨ 1 ounce of pretzels (100)    ¨ 3 cups of air-popped popcorn without butter or oil (80)    · Dairy:      ¨ 1 cup of skim or 1% milk (90)    ¨ 1 cup of 2% milk (120)    ¨ 1 cup of whole milk (160)    ¨ 1 cup of 2% chocolate milk (220)    ¨ 1 ounce of low-fat cheese with 3 grams of fat per ounce (70)    ¨ 1 ounce of cheddar cheese (114)    ¨ ½ cup of 1% fat cottage cheese (80)    ¨ 1 cup of plain or sugar-free, fat-free yogurt (90)    · Protein foods:      ¨ 3 ounces of fish (not breaded or fried) (95)    ¨ 3 ounces of breaded, fried fish (195)    ¨ ¾ cup of tuna canned in water (105)    ¨ 3 ounces of chicken breast without skin (105)    ¨ 1 fried chicken breast with skin (350)    ¨ ¼ cup of fat free egg substitute (40)    ¨ 1 large egg (75)    ¨ 3 ounces of lean beef or pork (165)    ¨ 3 ounces of fried pork chop or ham (185)    ¨ ½ cup of cooked dried beans, such as kidney, baker, lentils, or navy (115)    ¨ 3 ounces of bologna or lunch meat (225)    ¨ 2 links of breakfast sausage (140)    · Vegetables:      ¨ ½ cup of sliced mushrooms (10)    ¨ 1 cup of salad greens, such as lettuce, spinach, or shaye (15)    ¨ ½ cup of steamed asparagus (20)    ¨ ½ cup of cooked summer squash, zucchini squash, or green or wax beans (25)    ¨ 1 cup of broccoli or cauliflower florets, or 1 medium tomato (25)    ¨ 1 large raw carrot or ½ cup of cooked carrots (40)    ¨ ? of a medium cucumber or 1 stalk of celery (5)    ¨ 1 small baked potato (160)    ¨ 1 cup of breaded, fried vegetables (230)    · Fruit:      ¨ 1 (6-inch) banana (55)     ¨ ½ of a 4-inch grapefruit (55)    ¨ 15 grapes (60)    ¨ 1 medium orange or apple (70)    ¨ 1 large peach (65)    ¨ 1 cup of fresh pineapple chunks (75)    ¨ 1 cup of melon cubes (50)    ¨ 1¼ cups of whole strawberries (45)    ¨ ½ cup of fruit canned in juice (55)    ¨ ½ cup of fruit canned in heavy syrup (110)    ¨ ?  cup of raisins (130)    ¨ ½ cup of unsweetened fruit juice (60)    ¨ ½ cup of grape, cranberry, or prune juice (90)    · Fat:      ¨ 10 peanuts or 2 teaspoons of peanut butter (55)    ¨ 2 tablespoons of avocado or 1 tablespoon of regular salad dressing (45)    ¨ 2 slices of banks (90)    ¨ 1 teaspoon of oil, such as safflower, canola, corn, or olive oil (45)    ¨ 2 teaspoons of low-fat margarine, or 1 tablespoon of low-fat mayonnaise (50)    ¨ 1 teaspoon of regular margarine (40)    ¨ 1 tablespoon of regular mayonnaise (135)    ¨ 1 tablespoon of cream cheese or 2 tablespoons of low-fat cream cheese (45)    ¨ 2 tablespoons of vegetable shortening (215)    · Dessert and sweets:      ¨ 8 animal crackers or 5 vanilla wafers (80)    ¨ 1 frozen fruit juice bar (80)    ¨ ½ cup of ice milk or low-fat frozen yogurt (90)    ¨ ½ cup of sherbet or sorbet (125)    ¨ ½ cup of sugar-free pudding or custard (60)    ¨ ½ cup of ice cream (140)    ¨ ½ cup of pudding or custard (175)    ¨ 1 (2-inch) square chocolate brownie (185)    · Combination foods:      ¨ Bean burrito made with an 8-inch tortilla, without cheese (275)    ¨ Chicken breast sandwich with lettuce and tomato (325)    ¨ 1 cup of chicken noodle soup (60)    ¨ 1 beef taco (175)    ¨ Regular hamburger with lettuce and tomato (310)    ¨ Regular cheeseburger with lettuce and tomato (410)     ¨ ¼ of a 12-inch cheese pizza (280)    ¨ Fried fish sandwich with lettuce and tomato (425)    ¨ Hot dog and bun (275)    ¨ 1½ cups of macaroni and cheese (310)    ¨ Taco salad with a fried tortilla shell (870)    · Low-calorie foods:      ¨ 1 tablespoon of ketchup or 1 tablespoon of fat free sour cream (15)    ¨ 1 teaspoon of mustard (5)    ¨ ¼ cup of salsa (20)    ¨ 1 large dill pickle (15)    ¨ 1 tablespoon of fat free salad dressing (10)    ¨ 2 teaspoons of low-sugar, light jam or jelly, or 1 tablespoon of sugar-free syrup (15)    ¨ 1 sugar-free popsicle (15)    ¨ 1 cup of club soda, seltzer water, or diet soda (0)  CARE AGREEMENT:   You have the right to help plan your care  Discuss treatment options with your caregivers to decide what care you want to receive  You always have the right to refuse treatment  The above information is an  only  It is not intended as medical advice for individual conditions or treatments  Talk to your doctor, nurse or pharmacist before following any medical regimen to see if it is safe and effective for you  © 2017 [address] [employer] Information is for End User's use only and may not be sold, redistributed or otherwise used for commercial purposes  All illustrations and images included in CareNotes® are the copyrighted property of A D A M , Inc  or Josr Bowen

## 2019-03-15 ENCOUNTER — APPOINTMENT (OUTPATIENT)
Dept: LAB | Facility: CLINIC | Age: 34
End: 2019-03-15
Payer: COMMERCIAL

## 2019-03-15 ENCOUNTER — TELEPHONE (OUTPATIENT)
Dept: INTERNAL MEDICINE CLINIC | Facility: CLINIC | Age: 34
End: 2019-03-15

## 2019-03-15 ENCOUNTER — HOSPITAL ENCOUNTER (OUTPATIENT)
Dept: RADIOLOGY | Facility: HOSPITAL | Age: 34
Discharge: HOME | End: 2019-03-15
Payer: COMMERCIAL

## 2019-03-15 ENCOUNTER — TRANSCRIBE ORDERS (OUTPATIENT)
Dept: RADIOLOGY | Facility: HOSPITAL | Age: 34
End: 2019-03-15

## 2019-03-15 DIAGNOSIS — G89.29 CHRONIC BILATERAL LOW BACK PAIN WITHOUT SCIATICA: ICD-10-CM

## 2019-03-15 DIAGNOSIS — M54.50 CHRONIC BILATERAL LOW BACK PAIN WITHOUT SCIATICA: ICD-10-CM

## 2019-03-15 LAB
ALBUMIN SERPL BCP-MCNC: 3.8 G/DL (ref 3.5–5)
ALP SERPL-CCNC: 67 U/L (ref 46–116)
ALT SERPL W P-5'-P-CCNC: 17 U/L (ref 12–78)
ANION GAP SERPL CALCULATED.3IONS-SCNC: 4 MMOL/L (ref 4–13)
AST SERPL W P-5'-P-CCNC: 15 U/L (ref 5–45)
BILIRUB SERPL-MCNC: 0.49 MG/DL (ref 0.2–1)
BUN SERPL-MCNC: 10 MG/DL (ref 5–25)
CALCIUM SERPL-MCNC: 8.4 MG/DL (ref 8.3–10.1)
CHLORIDE SERPL-SCNC: 105 MMOL/L (ref 100–108)
CHOLEST SERPL-MCNC: 177 MG/DL (ref 50–200)
CO2 SERPL-SCNC: 26 MMOL/L (ref 21–32)
CREAT SERPL-MCNC: 0.66 MG/DL (ref 0.6–1.3)
GFR SERPL CREATININE-BSD FRML MDRD: 117 ML/MIN/1.73SQ M
GLUCOSE P FAST SERPL-MCNC: 78 MG/DL (ref 65–99)
HDLC SERPL-MCNC: 67 MG/DL (ref 40–60)
LDLC SERPL CALC-MCNC: 98 MG/DL (ref 0–100)
POTASSIUM SERPL-SCNC: 4 MMOL/L (ref 3.5–5.3)
PROT SERPL-MCNC: 7.5 G/DL (ref 6.4–8.2)
SODIUM SERPL-SCNC: 135 MMOL/L (ref 136–145)
TRIGL SERPL-MCNC: 62 MG/DL
TSH SERPL DL<=0.05 MIU/L-ACNC: 1.82 UIU/ML (ref 0.36–3.74)

## 2019-03-15 PROCEDURE — 36415 COLL VENOUS BLD VENIPUNCTURE: CPT | Performed by: PHYSICIAN ASSISTANT

## 2019-03-15 PROCEDURE — 80061 LIPID PANEL: CPT | Performed by: PHYSICIAN ASSISTANT

## 2019-03-15 PROCEDURE — 84443 ASSAY THYROID STIM HORMONE: CPT | Performed by: PHYSICIAN ASSISTANT

## 2019-03-15 PROCEDURE — 72083 X-RAY EXAM ENTIRE SPI 4/5 VW: CPT

## 2019-03-15 PROCEDURE — 80053 COMPREHEN METABOLIC PANEL: CPT | Performed by: PHYSICIAN ASSISTANT

## 2019-03-22 ENCOUNTER — TELEPHONE (OUTPATIENT)
Dept: INTERNAL MEDICINE CLINIC | Facility: CLINIC | Age: 34
End: 2019-03-22

## 2019-03-22 DIAGNOSIS — G89.29 CHRONIC BILATERAL LOW BACK PAIN WITHOUT SCIATICA: Primary | ICD-10-CM

## 2019-03-22 DIAGNOSIS — M54.50 CHRONIC BILATERAL LOW BACK PAIN WITHOUT SCIATICA: Primary | ICD-10-CM

## 2019-03-22 DIAGNOSIS — M41.25 OTHER IDIOPATHIC SCOLIOSIS, THORACOLUMBAR REGION: ICD-10-CM

## 2019-04-12 ENCOUNTER — OFFICE VISIT (OUTPATIENT)
Dept: INTERNAL MEDICINE CLINIC | Facility: CLINIC | Age: 34
End: 2019-04-12

## 2019-04-12 VITALS
HEIGHT: 65 IN | BODY MASS INDEX: 31.59 KG/M2 | SYSTOLIC BLOOD PRESSURE: 110 MMHG | HEART RATE: 100 BPM | DIASTOLIC BLOOD PRESSURE: 80 MMHG | TEMPERATURE: 98 F | WEIGHT: 189.6 LBS

## 2019-04-12 DIAGNOSIS — J45.30 MILD PERSISTENT ASTHMA WITHOUT COMPLICATION: Primary | ICD-10-CM

## 2019-04-12 DIAGNOSIS — F41.9 ANXIETY: ICD-10-CM

## 2019-04-12 DIAGNOSIS — E66.09 CLASS 1 OBESITY DUE TO EXCESS CALORIES WITHOUT SERIOUS COMORBIDITY WITH BODY MASS INDEX (BMI) OF 30.0 TO 30.9 IN ADULT: ICD-10-CM

## 2019-04-12 DIAGNOSIS — J30.1 SEASONAL ALLERGIC RHINITIS DUE TO POLLEN: ICD-10-CM

## 2019-04-12 PROCEDURE — 99213 OFFICE O/P EST LOW 20 MIN: CPT | Performed by: INTERNAL MEDICINE

## 2019-04-12 RX ORDER — KETOTIFEN FUMARATE 0.35 MG/ML
1 SOLUTION/ DROPS OPHTHALMIC 2 TIMES DAILY PRN
Qty: 5 ML | Refills: 2 | Status: SHIPPED | OUTPATIENT
Start: 2019-04-12 | End: 2019-07-11

## 2019-04-12 RX ORDER — MONTELUKAST SODIUM 10 MG/1
10 TABLET ORAL
Qty: 90 TABLET | Refills: 0 | Status: SHIPPED | OUTPATIENT
Start: 2019-04-12 | End: 2021-01-27

## 2019-04-16 ENCOUNTER — TELEPHONE (OUTPATIENT)
Dept: INTERNAL MEDICINE CLINIC | Facility: CLINIC | Age: 34
End: 2019-04-16

## 2019-06-03 ENCOUNTER — TELEPHONE (OUTPATIENT)
Dept: INTERNAL MEDICINE CLINIC | Facility: CLINIC | Age: 34
End: 2019-06-03

## 2019-06-11 ENCOUNTER — PATIENT OUTREACH (OUTPATIENT)
Dept: INTERNAL MEDICINE CLINIC | Facility: CLINIC | Age: 34
End: 2019-06-11

## 2019-06-11 ENCOUNTER — TELEPHONE (OUTPATIENT)
Dept: INTERNAL MEDICINE CLINIC | Facility: CLINIC | Age: 34
End: 2019-06-11

## 2019-06-11 DIAGNOSIS — Z71.1 MENTAL HEALTH-RELATED COMPLAINT: Primary | ICD-10-CM

## 2019-06-12 ENCOUNTER — PATIENT OUTREACH (OUTPATIENT)
Dept: INTERNAL MEDICINE CLINIC | Facility: CLINIC | Age: 34
End: 2019-06-12

## 2019-06-12 ENCOUNTER — OFFICE VISIT (OUTPATIENT)
Dept: INTERNAL MEDICINE CLINIC | Facility: CLINIC | Age: 34
End: 2019-06-12

## 2019-06-12 VITALS
SYSTOLIC BLOOD PRESSURE: 110 MMHG | DIASTOLIC BLOOD PRESSURE: 84 MMHG | HEART RATE: 80 BPM | TEMPERATURE: 97.8 F | HEIGHT: 65 IN | BODY MASS INDEX: 32.18 KG/M2 | WEIGHT: 193.12 LBS

## 2019-06-12 DIAGNOSIS — F31.31 BIPOLAR AFFECTIVE DISORDER, CURRENTLY DEPRESSED, MILD (HCC): ICD-10-CM

## 2019-06-12 DIAGNOSIS — F41.9 ANXIETY: Primary | ICD-10-CM

## 2019-06-12 DIAGNOSIS — Z78.9 NEED FOR FOLLOW-UP BY SOCIAL WORKER: Primary | ICD-10-CM

## 2019-06-12 PROCEDURE — 99213 OFFICE O/P EST LOW 20 MIN: CPT | Performed by: PHYSICIAN ASSISTANT

## 2019-06-12 RX ORDER — HYDROXYZINE HYDROCHLORIDE 25 MG/1
25 TABLET, FILM COATED ORAL 3 TIMES DAILY PRN
Qty: 30 TABLET | Refills: 2 | Status: SHIPPED | OUTPATIENT
Start: 2019-06-12 | End: 2021-01-27

## 2019-07-03 ENCOUNTER — PATIENT OUTREACH (OUTPATIENT)
Dept: INTERNAL MEDICINE CLINIC | Facility: CLINIC | Age: 34
End: 2019-07-03

## 2019-07-03 NOTE — PROGRESS NOTES
SW made f/u call to pt re resources to renew her MA [employer] financial counselors) as well as [place] [address] to assist with funding for [address] prior to getting her insuance back   Pt took this info as well as SW contact info and will f/u with SW if needed

## 2019-10-31 ENCOUNTER — OFFICE VISIT (OUTPATIENT)
Dept: INTERNAL MEDICINE CLINIC | Facility: CLINIC | Age: 34
End: 2019-10-31

## 2019-10-31 ENCOUNTER — APPOINTMENT (OUTPATIENT)
Dept: LAB | Facility: CLINIC | Age: 34
End: 2019-10-31
Payer: COMMERCIAL

## 2019-10-31 VITALS
BODY MASS INDEX: 34.23 KG/M2 | SYSTOLIC BLOOD PRESSURE: 100 MMHG | TEMPERATURE: 98.1 F | DIASTOLIC BLOOD PRESSURE: 80 MMHG | HEART RATE: 84 BPM | HEIGHT: 65 IN | WEIGHT: 205.47 LBS

## 2019-10-31 DIAGNOSIS — M41.25 OTHER IDIOPATHIC SCOLIOSIS, THORACOLUMBAR REGION: ICD-10-CM

## 2019-10-31 DIAGNOSIS — R40.0 DAYTIME SOMNOLENCE: Chronic | ICD-10-CM

## 2019-10-31 DIAGNOSIS — J45.20 MILD INTERMITTENT ASTHMA WITHOUT COMPLICATION: Chronic | ICD-10-CM

## 2019-10-31 DIAGNOSIS — Z23 NEED FOR INFLUENZA VACCINATION: ICD-10-CM

## 2019-10-31 DIAGNOSIS — M25.50 ARTHRALGIA, UNSPECIFIED JOINT: Chronic | ICD-10-CM

## 2019-10-31 DIAGNOSIS — R06.83 SNORING: Chronic | ICD-10-CM

## 2019-10-31 DIAGNOSIS — J45.30 MILD PERSISTENT ASTHMA WITHOUT COMPLICATION: ICD-10-CM

## 2019-10-31 DIAGNOSIS — R20.0 BILATERAL HAND NUMBNESS: Chronic | ICD-10-CM

## 2019-10-31 DIAGNOSIS — M54.50 CHRONIC BILATERAL LOW BACK PAIN WITHOUT SCIATICA: Primary | ICD-10-CM

## 2019-10-31 DIAGNOSIS — R51.9 MORNING HEADACHE: Chronic | ICD-10-CM

## 2019-10-31 DIAGNOSIS — G89.29 CHRONIC BILATERAL LOW BACK PAIN WITHOUT SCIATICA: Primary | ICD-10-CM

## 2019-10-31 PROBLEM — E66.09 CLASS 1 OBESITY DUE TO EXCESS CALORIES WITHOUT SERIOUS COMORBIDITY WITH BODY MASS INDEX (BMI) OF 30.0 TO 30.9 IN ADULT: Status: RESOLVED | Noted: 2019-03-11 | Resolved: 2019-10-31

## 2019-10-31 PROBLEM — E66.811 CLASS 1 OBESITY DUE TO EXCESS CALORIES WITHOUT SERIOUS COMORBIDITY WITH BODY MASS INDEX (BMI) OF 30.0 TO 30.9 IN ADULT: Status: RESOLVED | Noted: 2019-03-11 | Resolved: 2019-10-31

## 2019-10-31 PROBLEM — R10.13 DYSPEPSIA: Status: RESOLVED | Noted: 2019-03-11 | Resolved: 2019-10-31

## 2019-10-31 PROCEDURE — 86038 ANTINUCLEAR ANTIBODIES: CPT

## 2019-10-31 PROCEDURE — 99213 OFFICE O/P EST LOW 20 MIN: CPT | Performed by: PHYSICIAN ASSISTANT

## 2019-10-31 PROCEDURE — 86200 CCP ANTIBODY: CPT

## 2019-10-31 PROCEDURE — 86618 LYME DISEASE ANTIBODY: CPT

## 2019-10-31 PROCEDURE — 86430 RHEUMATOID FACTOR TEST QUAL: CPT

## 2019-10-31 PROCEDURE — 36415 COLL VENOUS BLD VENIPUNCTURE: CPT

## 2019-10-31 PROCEDURE — 1036F TOBACCO NON-USER: CPT | Performed by: PHYSICIAN ASSISTANT

## 2019-10-31 PROCEDURE — 3008F BODY MASS INDEX DOCD: CPT | Performed by: PHYSICIAN ASSISTANT

## 2019-10-31 RX ORDER — NAPROXEN 500 MG/1
500 TABLET ORAL 2 TIMES DAILY WITH MEALS
Qty: 40 TABLET | Refills: 0 | Status: SHIPPED | OUTPATIENT
Start: 2019-10-31 | End: 2021-06-17

## 2019-10-31 RX ORDER — LURASIDONE HYDROCHLORIDE 20 MG/1
TABLET, FILM COATED ORAL
Refills: 0 | COMMUNITY
Start: 2019-08-23 | End: 2021-01-27

## 2019-10-31 RX ORDER — ALBUTEROL SULFATE 90 UG/1
2 AEROSOL, METERED RESPIRATORY (INHALATION) EVERY 6 HOURS PRN
Qty: 18 G | Refills: 0 | Status: SHIPPED | OUTPATIENT
Start: 2019-10-31 | End: 2020-10-30

## 2019-10-31 NOTE — PROGRESS NOTES
Assessment/Plan:  As we reviewed I have sent a refill of your asthma rescue inhaler  You may use this only when needed however if you need to use this more than 2 times per week for 2 or 3 weeks in a row please contact our office as I will need to place you back on a daily inhaler  You do have known scoliosis in her spine and therefore I provided you with a script to call and schedule physical therapy  For your complaint of generalized aches and joint pains I have given you a script to get some labs completed  After discussion with headaches in the morning as well as snoring and gasping for air and feeling tired there is a possibility you have sleep disturbed breathing or sleep apnea and therefore referral placed to schedule with sleep provider  For the numbness in her hands I provided you with a script to get wrist braces to wear while you are sleeping and can remove these in the morning  If you get no relief of the numbness in your hands with using of the braces then we can refer you for an EMG to screen for carpal tunnel  We will contact you with your results of testing and advise on further evaluation or treatment as appropriate  No problem-specific Assessment & Plan notes found for this encounter  Diagnoses and all orders for this visit:    Chronic bilateral low back pain without sciatica  -     Ambulatory referral to Physical Therapy; Future    Other idiopathic scoliosis, thoracolumbar region  -     Ambulatory referral to Physical Therapy; Future    Arthralgia, unspecified joint  -     RF Screen w/ Reflex to Titer; Future  -     Cyclic citrul peptide antibody, IgG; Future  -     GLEN Screen w/ Reflex to Titer/Pattern; Future  -     Lyme Antibody Profile with reflex to WB; Future  -     naproxen (NAPROSYN) 500 mg tablet;  Take 1 tablet (500 mg total) by mouth 2 (two) times a day with meals for 20 days    Bilateral hand numbness  -     Cock Up Wrist Splint    Mild intermittent asthma without complication    Daytime somnolence  -     Ambulatory referral to Sleep Medicine; Future    Snoring  -     Ambulatory referral to Sleep Medicine; Future    Morning headache  -     Ambulatory referral to Sleep Medicine; Future    Mild persistent asthma without complication  -     albuterol (VENTOLIN HFA) 90 mcg/act inhaler; Inhale 2 puffs every 6 (six) hours as needed for wheezing    Need for influenza vaccination  -     influenza vaccine, [id number], quadrivalent, recombinant, PF, 0 5 mL, for patients 18 yr+ (FLUBLOK)    Other orders  -     LATUDA 20 MG tablet          Subjective:      Patient ID: Mateusz Mackey is a 36 y o  female  Pt here for episodic with c/o bilateral hands are numb and pain when wake up in the morning for past 2 weeks,  Patient however also reports that in the morning her fingers feel stiff and achy not just numb  She points specifically to the PIP of both hands, patient denies the pain at MCP or DI P  When asked patient states that the joints have not been swollen but did notice they were reddish  Also having increased back pain and sometimes knees hurt as well  Works as  so does a lot bending but states did not used to have a problem  Patient has had imaging completed which did show thoracolumbar scoliosis  Also c/o increased frequency of headaches in am, admits to snoring and has woken up gasping  Sleepy in the daytime drinking a lot of coffee to help, dinking 4-5 a day at work  Patient reports that the headaches are generalized  No change in vision or hearing  No radiation of pain into neck or upper extremities  Reviewed with patient that she needs to significantly reduce her caffeine intake as that can also be a trigger  With a m  Headaches as well as her reporting she is grinding her teeth suspect some level of sleep apnea and tension from grinding      Patient does have past medical history of asthma and as noted the maintenance inhaler would have run out 6 months ago  Patient states that she has not used it nor needed it  She also reports that she does not have a rescue inhaler  We reviewed that 1 was sent in March but she states she never picked it up  Patient states she has had no recent exacerbations  We did review however the importance that she must have a rescue inhaler for emergency use  We reviewed the importance that if she needs to use this more than twice per week on a regular basis to return here as she will need to be placed back on maintenance inhaler  The following portions of the patient's history were reviewed and updated as appropriate: allergies, current medications, past family history, past medical history, past social history, past surgical history and problem list     Review of Systems   Constitutional: Negative for activity change, appetite change, chills and fever  HENT: Negative  Eyes: Negative  Respiratory: Negative  Negative for cough, shortness of breath and wheezing  Cardiovascular: Negative for chest pain, palpitations and leg swelling  Gastrointestinal: Negative  Negative for abdominal pain, constipation and diarrhea  Endocrine: Negative  Genitourinary: Negative  Musculoskeletal: Positive for arthralgias, back pain and myalgias  Skin: Positive for color change  Negative for rash  Neurological: Positive for numbness and headaches  Negative for dizziness, weakness and light-headedness  Psychiatric/Behavioral: Positive for sleep disturbance  Objective:      /80 (BP Location: Right arm, Patient Position: Sitting, Cuff Size: Standard)   Pulse 84   Temp 98 1 °F (36 7 °C) (Oral)   Ht 5' 5" (1 651 m)   Wt 93 2 kg (205 lb 7 5 oz)   BMI 34 19 kg/m²          Physical Exam   Constitutional: She is oriented to person, place, and time  She appears well-developed and well-nourished  HENT:   Head: Normocephalic and atraumatic  Eyes: Pupils are equal, round, and reactive to light  Conjunctivae are normal    Neck: Normal range of motion  No thyromegaly present  Cardiovascular: Normal rate, regular rhythm and normal heart sounds  Pulmonary/Chest: Effort normal  She has no wheezes  Abdominal: Soft  Bowel sounds are normal  There is no tenderness  Musculoskeletal: She exhibits tenderness and deformity  She exhibits no edema  Thoracic back: She exhibits decreased range of motion, tenderness and deformity  She exhibits no bony tenderness  Back:    Bilateral hands negative Tinel  Negative Finkelstein  Patient reports numbness with Phalen however she is reporting left hand greater than right hand including thumb index middle and ring  No synovitis on exam   Joints are not red swollen  Neurological: She is alert and oriented to person, place, and time  No cranial nerve deficit  Skin: No rash noted  Psychiatric: She has a normal mood and affect  Her behavior is normal    Nursing note and vitals reviewed

## 2019-10-31 NOTE — PATIENT INSTRUCTIONS
As we reviewed I have sent a refill of your asthma rescue inhaler  You may use this only when needed however if you need to use this more than 2 times per week for 2 or 3 weeks in a row please contact our office as I will need to place you back on a daily inhaler  You do have known scoliosis in her spine and therefore I provided you with a script to call and schedule physical therapy  For your complaint of generalized aches and joint pains I have given you a script to get some labs completed  After discussion with headaches in the morning as well as snoring and gasping for air and feeling tired there is a possibility you have sleep disturbed breathing or sleep apnea and therefore referral placed to schedule with sleep provider  For the numbness in her hands I provided you with a script to get wrist braces to wear while you are sleeping and can remove these in the morning  If you get no relief of the numbness in your hands with using of the braces then we can refer you for an EMG to screen for carpal tunnel  We will contact you with your results of testing and advise on further evaluation or treatment as appropriate

## 2019-11-01 LAB
B BURGDOR IGG+IGM SER-ACNC: <0.91 ISR (ref 0–0.9)
RHEUMATOID FACT SER QL LA: NEGATIVE
RYE IGE QN: NEGATIVE

## 2019-11-03 LAB — CCP IGA+IGG SERPL IA-ACNC: 7 UNITS (ref 0–19)

## 2020-02-19 ENCOUNTER — APPOINTMENT (OUTPATIENT)
Dept: LAB | Facility: HOSPITAL | Age: 35
End: 2020-02-19
Payer: COMMERCIAL

## 2020-02-19 ENCOUNTER — ANNUAL EXAM (OUTPATIENT)
Dept: OBGYN CLINIC | Facility: CLINIC | Age: 35
End: 2020-02-19
Payer: COMMERCIAL

## 2020-02-19 ENCOUNTER — HOSPITAL ENCOUNTER (OUTPATIENT)
Dept: RADIOLOGY | Facility: HOSPITAL | Age: 35
Discharge: HOME | End: 2020-02-19
Payer: COMMERCIAL

## 2020-02-19 ENCOUNTER — TRANSCRIBE ORDERS (OUTPATIENT)
Dept: RADIOLOGY | Facility: HOSPITAL | Age: 35
End: 2020-02-19

## 2020-02-19 ENCOUNTER — TRANSCRIBE ORDERS (OUTPATIENT)
Dept: LAB | Facility: HOSPITAL | Age: 35
End: 2020-02-19

## 2020-02-19 VITALS
HEIGHT: 66 IN | WEIGHT: 204 LBS | BODY MASS INDEX: 32.78 KG/M2 | DIASTOLIC BLOOD PRESSURE: 76 MMHG | SYSTOLIC BLOOD PRESSURE: 110 MMHG

## 2020-02-19 DIAGNOSIS — R53.83 OTHER FATIGUE: ICD-10-CM

## 2020-02-19 DIAGNOSIS — Z01.419 WOMEN'S ANNUAL ROUTINE GYNECOLOGICAL EXAMINATION: Primary | ICD-10-CM

## 2020-02-19 DIAGNOSIS — Z01.818 OTHER SPECIFIED PRE-OPERATIVE EXAMINATION: ICD-10-CM

## 2020-02-19 DIAGNOSIS — Z01.812 PRE-OPERATIVE LABORATORY EXAMINATION: ICD-10-CM

## 2020-02-19 DIAGNOSIS — E66.01 MORBID OBESITY (HCC): ICD-10-CM

## 2020-02-19 DIAGNOSIS — E66.01 MORBID OBESITY (HCC): Primary | ICD-10-CM

## 2020-02-19 DIAGNOSIS — Z01.818 OTHER SPECIFIED PRE-OPERATIVE EXAMINATION: Primary | ICD-10-CM

## 2020-02-19 LAB
ALBUMIN SERPL BCP-MCNC: 3.6 G/DL (ref 3.5–5)
ALP SERPL-CCNC: 69 U/L (ref 46–116)
ALT SERPL W P-5'-P-CCNC: 14 U/L (ref 12–78)
ANION GAP SERPL CALCULATED.3IONS-SCNC: 5 MMOL/L (ref 4–13)
AST SERPL W P-5'-P-CCNC: 12 U/L (ref 5–45)
BASOPHILS # BLD AUTO: 0.04 THOUSANDS/ΜL (ref 0–0.1)
BASOPHILS NFR BLD AUTO: 1 % (ref 0–1)
BILIRUB DIRECT SERPL-MCNC: 0.1 MG/DL (ref 0–0.2)
BILIRUB SERPL-MCNC: 0.35 MG/DL (ref 0.2–1)
BUN SERPL-MCNC: 12 MG/DL (ref 5–25)
CALCIUM SERPL-MCNC: 8.9 MG/DL (ref 8.3–10.1)
CHLORIDE SERPL-SCNC: 107 MMOL/L (ref 100–108)
CHOLEST SERPL-MCNC: 179 MG/DL (ref 50–200)
CO2 SERPL-SCNC: 25 MMOL/L (ref 21–32)
CREAT SERPL-MCNC: 0.76 MG/DL (ref 0.6–1.3)
EOSINOPHIL # BLD AUTO: 0.02 THOUSAND/ΜL (ref 0–0.61)
EOSINOPHIL NFR BLD AUTO: 0 % (ref 0–6)
ERYTHROCYTE [DISTWIDTH] IN BLOOD BY AUTOMATED COUNT: 12.9 % (ref 11.6–15.1)
GFR SERPL CREATININE-BSD FRML MDRD: 103 ML/MIN/1.73SQ M
GLUCOSE P FAST SERPL-MCNC: 79 MG/DL (ref 65–99)
HCT VFR BLD AUTO: 41 % (ref 34.8–46.1)
HDLC SERPL-MCNC: 61 MG/DL
HGB BLD-MCNC: 13.5 G/DL (ref 11.5–15.4)
IMM GRANULOCYTES # BLD AUTO: 0.03 THOUSAND/UL (ref 0–0.2)
IMM GRANULOCYTES NFR BLD AUTO: 0 % (ref 0–2)
INR PPP: 1.05 (ref 0.84–1.19)
LDLC SERPL CALC-MCNC: 103 MG/DL (ref 0–100)
LYMPHOCYTES # BLD AUTO: 2.14 THOUSANDS/ΜL (ref 0.6–4.47)
LYMPHOCYTES NFR BLD AUTO: 27 % (ref 14–44)
MCH RBC QN AUTO: 33.7 PG (ref 26.8–34.3)
MCHC RBC AUTO-ENTMCNC: 32.9 G/DL (ref 31.4–37.4)
MCV RBC AUTO: 102 FL (ref 82–98)
MONOCYTES # BLD AUTO: 0.67 THOUSAND/ΜL (ref 0.17–1.22)
MONOCYTES NFR BLD AUTO: 9 % (ref 4–12)
NEUTROPHILS # BLD AUTO: 5 THOUSANDS/ΜL (ref 1.85–7.62)
NEUTS SEG NFR BLD AUTO: 63 % (ref 43–75)
NONHDLC SERPL-MCNC: 118 MG/DL
NRBC BLD AUTO-RTO: 0 /100 WBCS
PLATELET # BLD AUTO: 355 THOUSANDS/UL (ref 149–390)
PMV BLD AUTO: 9.4 FL (ref 8.9–12.7)
POTASSIUM SERPL-SCNC: 3.9 MMOL/L (ref 3.5–5.3)
PROT SERPL-MCNC: 7.6 G/DL (ref 6.4–8.2)
PROTHROMBIN TIME: 13.3 SECONDS (ref 11.6–14.5)
RBC # BLD AUTO: 4.01 MILLION/UL (ref 3.81–5.12)
SODIUM SERPL-SCNC: 137 MMOL/L (ref 136–145)
T3 SERPL-MCNC: 1.3 NG/ML (ref 0.6–1.8)
T4 SERPL-MCNC: 8 UG/DL (ref 4.7–13.3)
TRIGL SERPL-MCNC: 77 MG/DL
TSH SERPL DL<=0.05 MIU/L-ACNC: 1.33 UIU/ML (ref 0.36–3.74)
WBC # BLD AUTO: 7.9 THOUSAND/UL (ref 4.31–10.16)

## 2020-02-19 PROCEDURE — 80076 HEPATIC FUNCTION PANEL: CPT

## 2020-02-19 PROCEDURE — 84443 ASSAY THYROID STIM HORMONE: CPT

## 2020-02-19 PROCEDURE — 71046 X-RAY EXAM CHEST 2 VIEWS: CPT

## 2020-02-19 PROCEDURE — G0145 SCR C/V CYTO,THINLAYER,RESCR: HCPCS | Performed by: NURSE PRACTITIONER

## 2020-02-19 PROCEDURE — 85610 PROTHROMBIN TIME: CPT

## 2020-02-19 PROCEDURE — 85025 COMPLETE CBC W/AUTO DIFF WBC: CPT

## 2020-02-19 PROCEDURE — 84480 ASSAY TRIIODOTHYRONINE (T3): CPT

## 2020-02-19 PROCEDURE — 87624 HPV HI-RISK TYP POOLED RSLT: CPT | Performed by: NURSE PRACTITIONER

## 2020-02-19 PROCEDURE — 36415 COLL VENOUS BLD VENIPUNCTURE: CPT

## 2020-02-19 PROCEDURE — 99395 PREV VISIT EST AGE 18-39: CPT | Performed by: NURSE PRACTITIONER

## 2020-02-19 PROCEDURE — 80048 BASIC METABOLIC PNL TOTAL CA: CPT

## 2020-02-19 PROCEDURE — 80061 LIPID PANEL: CPT

## 2020-02-19 PROCEDURE — 84436 ASSAY OF TOTAL THYROXINE: CPT

## 2020-02-19 NOTE — PROGRESS NOTES
Subjective    HPI:     Elaine Bonds is a 36 y o  female  She is a [place] 4 Para 3, with 3 prior C/S  Her menstrual cycles are regular and predictable, heavy flow lasting 7 days  Her current method of contraception includes tubal ligation  She complains of pain in the lower pelvic region with intercourse  State it's with every encounter and position changes does not help  She denies /GI and Gyn complaints  She feels feels safe at home  She denies depression/anxiety  Denies changes to her  medical, surgical and family history    Her dental care is up-to-date  She eats a healthy diet  She is not happy with her weight  She is getting the sleeve done on 3/17/2020  Gynecologic History    Patient's last menstrual period was 2020  Last Pap: 18  Results were: normal      Obstetric History    OB History    Para Term  AB Living   4 3     1 3   SAB TAB Ectopic Multiple Live Births     1     3      # Outcome Date GA Lbr Luciano/2nd Weight Sex Delivery Anes PTL Lv   4 TAB            3 Para            2 Para            1 Para                The following portions of the patient's history were reviewed and updated as appropriate: allergies, current medications, past family history, past medical history, past social history, past surgical history and problem list     Review of Systems    Pertinent items are noted in HPI  Objective    Physical Exam   Constitutional: She is oriented to person, place, and time  Vital signs are normal  She appears well-developed and well-nourished  Genitourinary: Vagina normal and uterus normal  Pelvic exam was performed with patient supine  There is no rash, tenderness, lesion or Bartholin's cyst on the right labia  There is no rash, tenderness, lesion or Bartholin's cyst on the left labia  Right adnexum does not display mass, does not display tenderness and does not display fullness   Left adnexum does not display mass, does not display tenderness and does not display fullness  Cervix is not parous  Cervix does not exhibit motion tenderness, lesion, discharge, friability, polyp or nabothian cyst      Uterus is anteverted  Genitourinary Comments: Pain was not reproduced with bimanual exam     HENT:   Head: Normocephalic and atraumatic  Neck: Neck supple  No thyromegaly present  Cardiovascular: Normal rate, regular rhythm, S1 normal, S2 normal and normal heart sounds  Pulmonary/Chest: Effort normal and breath sounds normal  Right breast exhibits no inverted nipple, no mass, no nipple discharge, no skin change and no tenderness  Left breast exhibits no inverted nipple, no mass, no nipple discharge, no skin change and no tenderness  Abdominal: Soft  Bowel sounds are normal  She exhibits no distension and no mass  There is no tenderness  There is no guarding  Lymphadenopathy:     She has no cervical adenopathy  She has no axillary adenopathy  Neurological: She is alert and oriented to person, place, and time  Skin: Skin is warm, dry and intact  No rash noted  Psychiatric: She has a normal mood and affect  Nursing note and vitals reviewed  Assessment and Plan    Gómez was seen today for gynecologic exam and dyspareunia  Diagnoses and all orders for this visit:    Women's annual routine gynecological examination  -     Liquid-based pap, screening        Patient informed of a Stable GYN exam  A pap smear was performed  I have discussed the importance of exercise and healthy diet as well as adequate intake of calcium and vitamin D  The current ASCCP guidelines were reviewed  The low risk patient will receive pap smear screening every 3 years until the age of 34 and then every 3 to 5 years with HPV co-testing from the ages of 33-67  I emphasized the importance of an annual pelvic and breast exam  A yearly mammogram is recommended for breast cancer screening starting at age 36  All questions have been answered to her satisfaction  Follow up in: 1 year

## 2020-02-22 LAB
LAB AP GYN PRIMARY INTERPRETATION: NORMAL
LAB AP LMP: NORMAL
Lab: NORMAL

## 2020-02-25 ENCOUNTER — APPOINTMENT (EMERGENCY)
Dept: RADIOLOGY | Facility: HOSPITAL | Age: 35
End: 2020-02-25
Payer: COMMERCIAL

## 2020-02-25 ENCOUNTER — HOSPITAL ENCOUNTER (EMERGENCY)
Facility: HOSPITAL | Age: 35
Discharge: HOME | End: 2020-02-25
Attending: EMERGENCY MEDICINE
Payer: COMMERCIAL

## 2020-02-25 VITALS
SYSTOLIC BLOOD PRESSURE: 132 MMHG | TEMPERATURE: 97.9 F | RESPIRATION RATE: 16 BRPM | WEIGHT: 200 LBS | DIASTOLIC BLOOD PRESSURE: 74 MMHG | OXYGEN SATURATION: 99 % | HEART RATE: 95 BPM | BODY MASS INDEX: 32.28 KG/M2

## 2020-02-25 DIAGNOSIS — S92.354A: Primary | ICD-10-CM

## 2020-02-25 LAB
EXT PREG TEST URINE: NORMAL
EXT. CONTROL ED NAV: NORMAL

## 2020-02-25 PROCEDURE — 99284 EMERGENCY DEPT VISIT MOD MDM: CPT

## 2020-02-25 PROCEDURE — 73610 X-RAY EXAM OF ANKLE: CPT

## 2020-02-25 PROCEDURE — 81025 URINE PREGNANCY TEST: CPT | Performed by: PHYSICIAN ASSISTANT

## 2020-02-25 PROCEDURE — 99284 EMERGENCY DEPT VISIT MOD MDM: CPT | Performed by: PHYSICIAN ASSISTANT

## 2020-02-25 PROCEDURE — 29515 APPLICATION SHORT LEG SPLINT: CPT | Performed by: PHYSICIAN ASSISTANT

## 2020-02-25 RX ORDER — HYDROCODONE BITARTRATE AND ACETAMINOPHEN 5; 325 MG/1; MG/1
1 TABLET ORAL EVERY 4 HOURS PRN
Qty: 6 TABLET | Refills: 0 | Status: SHIPPED | OUTPATIENT
Start: 2020-02-25 | End: 2020-02-26

## 2020-02-25 NOTE — ED PROVIDER NOTES
History  Chief Complaint   Patient presents with    Foot Pain     pt "sprained" her R foot x1 week ago  Denies being treated  C/o continued pain to the foot  +swelling     42-year-old female with history of asthma presents emergency department for evaluation of right ankle pain  Patient reports approximately a week ago, she tripped over her son's toys, and inverted her ankle  Patient reports 2 days later, she repeated the injury of the same leg  Since that time, patient reports difficulty ambulating  Patient states she has taken Tylenol and Motrin, with little relief  Patient endorses history of fracture in that same ankle, without ORIF  She denies any calf swelling, redness, or tenderness  Ankle Pain   Location:  Ankle  Injury: yes    Mechanism of injury: fall    Fall:     Fall occurred:  Tripped    Impact surface:  Carpet  Ankle location:  R ankle  Pain details:     Quality:  Aching and throbbing    Radiates to:  R leg    Severity:  Moderate    Onset quality:  Sudden    Duration:  1 week    Timing:  Constant    Progression:  Worsening  Chronicity:  New  Dislocation: no    Prior injury to area:  Yes  Relieved by:  Nothing  Worsened by:  Bearing weight, extension and flexion  Ineffective treatments:  NSAIDs and acetaminophen  Associated symptoms: decreased ROM, swelling and tingling    Associated symptoms: no back pain, no fatigue, no fever, no muscle weakness, no neck pain, no numbness and no stiffness        Prior to Admission Medications   Prescriptions Last Dose Informant Patient Reported? Taking?    LATUDA 20 MG tablet   Yes No   albuterol (VENTOLIN HFA) 90 mcg/act inhaler   No No   Sig: Inhale 2 puffs every 6 (six) hours as needed for wheezing   hydrOXYzine HCL (ATARAX) 25 mg tablet   No No   Sig: Take 1 tablet (25 mg total) by mouth 3 (three) times a day as needed for anxiety for up to 30 days   ketotifen (ZADITOR) 0 025 % ophthalmic solution   No No   Sig: Administer 1 drop to both eyes 2 (two) times a day as needed (itchy eyes) for up to 90 days   loratadine (CLARITIN) 10 mg tablet   No No   Sig: Take 1 tablet (10 mg total) by mouth daily for 180 days   methocarbamol (ROBAXIN) 750 mg tablet   No No   Sig: Take 1 tablet (750 mg total) by mouth 2 (two) times a day as needed for muscle spasms for up to 15 days   montelukast (SINGULAIR) 10 mg tablet   No No   Sig: Take 1 tablet (10 mg total) by mouth daily at bedtime for 90 days   naproxen (NAPROSYN) 500 mg tablet   No No   Sig: Take 1 tablet (500 mg total) by mouth 2 (two) times a day with meals for 20 days   ranitidine (ZANTAC) 150 mg tablet   No No   Sig: Take 0 5 tablets (75 mg total) by mouth 2 (two) times a day as needed for heartburn for up to 30 days      Facility-Administered Medications: None       Past Medical History:   Diagnosis Date    Asthma     GERD (gastroesophageal reflux disease)     last assessed: 2014    Migraine     Pregnancy induced hypertension        Past Surgical History:   Procedure Laterality Date     SECTION      x 3    INDUCED       TUBAL LIGATION         Family History   Problem Relation Age of Onset    Asthma Mother     Migraines Mother     Osteoarthritis Mother     Cervical cancer Mother     Hypertension Mother     Arthritis Father     Diabetes Father     No Known Problems Sister     No Known Problems Brother     Asthma Daughter     Asthma Son     Diabetes Maternal Grandmother     Heart failure Maternal Grandmother     Arthritis Maternal Grandmother      I have reviewed and agree with the history as documented      E-Cigarette/Vaping    E-Cigarette Use Never User      E-Cigarette/Vaping Substances     Social History     Tobacco Use    Smoking status: Never Smoker    Smokeless tobacco: Never Used   Substance Use Topics    Alcohol use: Not Currently     Frequency: Monthly or less     Binge frequency: Never     Comment: social    Drug use: Never       Review of Systems Constitutional: Negative for chills, fatigue and fever  HENT: Negative for congestion and sore throat  Respiratory: Negative for cough and shortness of breath  Cardiovascular: Negative for chest pain  Gastrointestinal: Negative for abdominal pain, diarrhea, nausea and vomiting  Musculoskeletal: Positive for arthralgias and joint swelling  Negative for back pain, gait problem, neck pain, neck stiffness and stiffness  Skin: Positive for color change  Negative for rash  Neurological: Negative for numbness and headaches  All other systems reviewed and are negative  Physical Exam  Physical Exam   Constitutional: She is oriented to person, place, and time  Vital signs are normal  She appears well-developed and well-nourished  No distress  HENT:   Head: Normocephalic and atraumatic  Right Ear: Hearing and external ear normal    Left Ear: Hearing and external ear normal    Nose: Nose normal    Mouth/Throat: Mucous membranes are normal    Eyes: Conjunctivae are normal    Neck: Normal range of motion and full passive range of motion without pain  Cardiovascular: Normal rate, regular rhythm, S1 normal, S2 normal and normal heart sounds  Pulses:       Dorsalis pedis pulses are 2+ on the right side, and 2+ on the left side  Posterior tibial pulses are 2+ on the right side, and 2+ on the left side  Pulmonary/Chest: Effort normal and breath sounds normal  She has no decreased breath sounds  She has no wheezes  She has no rhonchi  Musculoskeletal:        Right ankle: She exhibits decreased range of motion, swelling and ecchymosis  She exhibits no deformity, no laceration and normal pulse  Tenderness  Lateral malleolus and head of 5th metatarsal tenderness found  Achilles tendon exhibits no pain, no defect and normal Whitehead's test results  Right lower leg: Normal  She exhibits no tenderness, no swelling and no edema          Feet:    Normal range of motion; no edema, tenderness, or deformities  Neurological: She is alert and oriented to person, place, and time  Sensation grossly intact bilateral feet   Skin: Skin is warm and dry  Capillary refill takes less than 2 seconds  Ecchymosis noted  No abrasion, no laceration and no rash noted  Nursing note and vitals reviewed  Vital Signs  ED Triage Vitals [02/25/20 0929]   Temperature Pulse Respirations Blood Pressure SpO2   97 9 °F (36 6 °C) 95 16 132/74 99 %      Temp Source Heart Rate Source Patient Position - Orthostatic VS BP Location FiO2 (%)   Oral Monitor -- -- --      Pain Score       Worst Possible Pain           Vitals:    02/25/20 0929   BP: 132/74   Pulse: 95         Visual Acuity      ED Medications  Medications - No data to display    Diagnostic Studies  Results Reviewed     Procedure Component Value Units Date/Time    POCT pregnancy, urine [[id number]]  (Normal) Resulted:  02/25/20 1055    Lab Status:  Final result Updated:  02/25/20 1056     EXT PREG TEST UR (Ref: Negative) neg     Control valid                 XR ankle 3+ views RIGHT   Final Result by Mary Carmen Bocanegra MD (02/25 1038)      Acute nondisplaced fracture of the 5th metatarsal base  Soft tissue swelling of the lateral malleolus  The study was marked in [facility] for immediate notification              Workstation performed: [id number]                    Procedures  Splint application  Date/Time: 2/25/2020 10:51 AM  Performed by: Jatin Medeiros PA-C  Authorized by: Jatin Medeiros PA-C     Performing Provider:  Tech  Consent:     Consent obtained:  Verbal    Consent given by:  Patient    Risks discussed:  Discoloration, numbness and swelling    Alternatives discussed:  No treatment, delayed treatment, alternative treatment and referral  Universal protocol:     Patient identity confirmed:  Verbally with patient  Indication:     Indications: fracture    Pre-procedure details:     Sensation:  Normal  Procedure details:     Laterality:  Right Location:  Ankle    Ankle:  R ankle    Splint type:  Short leg    Supplies:  Ortho-Glass  Post-procedure details:     Pain:  Unchanged    Sensation:  Normal    Neurovascular Exam: skin pink, capillary refill <2 sec, normal pulses and skin intact, warm, and dry      Patient tolerance of procedure: Tolerated well, no immediate complications             ED Course  ED Course as of Feb 25 1452   Tue Feb 25, 2020   1041 XR ankle 3+ views RIGHT   1041 Acute nondisplaced fracture of the 5th metatarsal base  MDM  Number of Diagnoses or Management Options  Nondisplaced fracture of fifth right metatarsal bone:   Diagnosis management comments: 52-year-old female presents with right ankle pain after tripping over some steroids x2  On exam, patient exhibits tenderness at the 5th met head, along with some swelling and ecchymosis  X-ray demonstrates a 5th metatarsal head fracture  Patient was placed into a posterior short-leg splint, counseled to be nonweightbearing, given crutches instructed on the use, and was instructed follow up with Orthopedics  Posterior short-leg Splint was placed by ED tech  Examined by me post splint placement  Splint is in good position and neurovascular exam intact after splint placement  I or my delegate reviewed this patient through the [facility] portal and did not find any evidence of narcotic abuse or doctor shopping  Patient was prescribed 6 tablets of Potter to the pharmacy of her choice  The management plan was discussed in detail with the patient at bedside and all questions were answered  The prior to discharge, we provided both verbal and written instructions  We discussed with the patient the signs and symptoms for which to return to the emergency department  All questions were answered and patient was comfortable with the plan of care and discharged to home    Instructed the patient to follow up with the primary care provider and/or special as provided and their written instructions  The patient verbalized understanding of our discussion and plan of care, and agrees to return to the Emergency Department for concerns and progression of illness  Disposition  Final diagnoses:   Nondisplaced fracture of fifth right metatarsal bone     Time reflects when diagnosis was documented in both MDM as applicable and the Disposition within this note     Time User Action Codes Description Comment    2/25/2020 10:45 AM Georgina Joseph Add [C18 [id number]] Nondisplaced fracture of fifth right metatarsal bone       ED Disposition     ED Disposition Condition Date/Time Comment    Discharge Stable Tue Feb 25, 2020 10:53 AM Lizette Flores discharge to home/self care  Follow-up Information     Follow up With Specialties Details Why Contact Info Additional Information    Shannan Lubin PA-C Internal Medicine, Physician Assistant Schedule an appointment as soon as possible for a visit in 3 days  [address] Orthopedic Surgery  follow up 5th metatarsal fx [address] [id number]  [address], [address]  [facility]          Discharge Medication List as of 2/25/2020 10:53 AM      START taking these medications    Details   HYDROcodone-acetaminophen (NORCO) 5-325 mg per tablet Take 1 tablet by mouth every 4 (four) hours as needed for pain for up to 1 dayMax Daily Amount: 6 tablets, Starting Tue 2/25/2020, Until Wed 2/26/2020, Normal         CONTINUE these medications which have NOT CHANGED    Details   albuterol (VENTOLIN HFA) 90 mcg/act inhaler Inhale 2 puffs every 6 (six) hours as needed for wheezing, Starting Thu 10/31/2019, Until Fri 10/30/2020, Normal      hydrOXYzine HCL (ATARAX) 25 mg tablet Take 1 tablet (25 mg total) by mouth 3 (three) times a day as needed for anxiety for up to 30 days, Starting Wed 6/12/2019, Until Fri 7/12/2019, Normal      ketotifen (ZADITOR) 0 025 % ophthalmic solution Administer 1 drop to both eyes 2 (two) times a day as needed (itchy eyes) for up to 90 days, Starting Fri 4/12/2019, Until Thu 7/11/2019, Normal      LATUDA 20 MG tablet Starting Fri 8/23/2019, Historical Med      loratadine (CLARITIN) 10 mg tablet Take 1 tablet (10 mg total) by mouth daily for 180 days, Starting Mon 3/11/2019, Until Sat 9/7/2019, Normal      methocarbamol (ROBAXIN) 750 mg tablet Take 1 tablet (750 mg total) by mouth 2 (two) times a day as needed for muscle spasms for up to 15 days, Starting Mon 3/11/2019, Until Tue 3/26/2019, Normal      montelukast (SINGULAIR) 10 mg tablet Take 1 tablet (10 mg total) by mouth daily at bedtime for 90 days, Starting Fri 4/12/2019, Until Thu 7/11/2019, Normal      naproxen (NAPROSYN) 500 mg tablet Take 1 tablet (500 mg total) by mouth 2 (two) times a day with meals for 20 days, Starting Thu 10/31/2019, Until Wed 11/20/2019, Normal      ranitidine (ZANTAC) 150 mg tablet Take 0 5 tablets (75 mg total) by mouth 2 (two) times a day as needed for heartburn for up to 30 days, Starting Mon 3/11/2019, Until Wed 4/10/2019, Normal           No discharge procedures on file      PDMP Review       Value Time User    PDMP Reviewed  Yes 2/25/2020 10:44 AM La Nena Rothman PA-C          ED Provider  Electronically Signed by           La Nena Rothman PA-C  02/25/20 3216

## 2020-02-25 NOTE — ED NOTES
Ambulatory to restroom; pt given crutches for ambulation, but pt refused to use them   Pt walked without assistance      Ruben Pate RN  02/25/20 1181

## 2020-02-28 ENCOUNTER — HOSPITAL ENCOUNTER (OUTPATIENT)
Dept: RADIOLOGY | Facility: HOSPITAL | Age: 35
Discharge: HOME | End: 2020-02-28
Attending: ORTHOPAEDIC SURGERY
Payer: COMMERCIAL

## 2020-02-28 ENCOUNTER — OFFICE VISIT (OUTPATIENT)
Dept: OBGYN CLINIC | Facility: HOSPITAL | Age: 35
End: 2020-02-28
Payer: COMMERCIAL

## 2020-02-28 VITALS — HEIGHT: 66 IN | WEIGHT: 200 LBS | BODY MASS INDEX: 32.14 KG/M2

## 2020-02-28 DIAGNOSIS — S92.351A CLOSED FRACTURE OF BASE OF FIFTH METATARSAL BONE OF RIGHT FOOT, INITIAL ENCOUNTER: Primary | ICD-10-CM

## 2020-02-28 DIAGNOSIS — M79.671 PAIN IN RIGHT FOOT: ICD-10-CM

## 2020-02-28 PROBLEM — S92.353A CLOSED FRACTURE OF BASE OF FIFTH METATARSAL BONE: Status: ACTIVE | Noted: 2020-02-28

## 2020-02-28 PROCEDURE — 1036F TOBACCO NON-USER: CPT | Performed by: ORTHOPAEDIC SURGERY

## 2020-02-28 PROCEDURE — 99203 OFFICE O/P NEW LOW 30 MIN: CPT | Performed by: ORTHOPAEDIC SURGERY

## 2020-02-28 PROCEDURE — 73630 X-RAY EXAM OF FOOT: CPT

## 2020-02-28 PROCEDURE — 3008F BODY MASS INDEX DOCD: CPT | Performed by: ORTHOPAEDIC SURGERY

## 2020-02-28 NOTE — LETTER
February 28, 2020     Keila Figueroa PA-C  [address] [facility]    Patient: Rona Randle   YOB: 1985   Date of Visit: 2/28/2020       Dear Ms Berta Evans: Thank you for referring Rona Randle to me for evaluation  Below are my notes for this consultation  If you have questions, please do not hesitate to call me  I look forward to following your patient along with you  Sincerely,        Amalia Craig MD        CC: No Recipients  Amalia Craig MD  2/28/2020  4:23 PM  Sign at close encounter  Chief Complaint   Patient presents with    Right Foot - Pain           Assessment:  Fracture right 5th metatarsal base    Plan :  I explained to the patient she has a fracture at the base of the right 5th metatarsal   This does not need surgery, but is notoriously a slow fracture to heal   Unfortunately, she is on her feet full-time doing excessive walking and this is going to prolong, rather than hasten, her recovery  She has been working for 2 weeks and can continue working  I fitted her with a Tubigrip compression stocking and a hard-soled shoe to wear when she is up and around  She was given a cane to use in the opposite left hand to unload the right foot  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  I gave her off for 3 days to get the swelling down and I told her about elevation as much as possible  She can put ice on the foot for 15 minutes 4 times a day if needed and take Advil, Aleve, or Tylenol if needed for pain  I will see her in 1 month more for repeat x-ray and progression of her work activities    HPI:  This is a 36-year-old  female presenting for orthopedic evaluation regarding her right foot injury, she is referred by the ED  She states that on 02/18/2020 she inverted her foot when she tripped on son's toys  Two days later she had a recurrence of the same injury    She presented to the ED on 2020 to be evaluated  X-rays were performed and she was diagnosed with a base of 5th metatarsal fracture of the right foot  She was splinted but admits she only wore this for one day  She is a single mom and needed to drive and go to work  She is a  and is required to inspect 4 floors, on her feet and walking all day  He pain is localized over the lateral foot  She also complaints of stiffness of the toes       PMHx:         Past Medical History:   Diagnosis Date    Asthma     GERD (gastroesophageal reflux disease)     last assessed: 2014    Migraine     Pregnancy induced hypertension        Past Surgical History:   Procedure Laterality Date     SECTION      x 3    INDUCED       TUBAL LIGATION         Family History   Problem Relation Age of Onset    Asthma Mother     Migraines Mother     Osteoarthritis Mother     Cervical cancer Mother     Hypertension Mother     Arthritis Father     Diabetes Father     No Known Problems Sister     No Known Problems Brother     Asthma Daughter     Asthma Son     Diabetes Maternal Grandmother     Heart failure Maternal Grandmother     Arthritis Maternal Grandmother        Social History     Socioeconomic History    Marital status: Single     Spouse name: Not on file    Number of children: 3    Years of education: Not on file    Highest education level: Not on file   Occupational History    Not on file   Social Needs    Financial resource strain: Somewhat hard    Food insecurity:     Worry: Sometimes true     Inability: Sometimes true    Transportation needs:     Medical: No     Non-medical: No   Tobacco Use    Smoking status: Never Smoker    Smokeless tobacco: Never Used   Substance and Sexual Activity    Alcohol use: Not Currently     Frequency: Monthly or less     Binge frequency: Never     Comment: social    Drug use: Never    Sexual activity: Yes     Partners: Male     Birth control/protection: None   Lifestyle    Physical activity:     Days per week: 0 days     Minutes per session: 0 min    Stress: Very much   Relationships    Social connections:     Talks on phone: Twice a week     Gets together: Once a week     Attends Sabianist service: Never     Active member of club or organization: No     Attends meetings of clubs or organizations: Never     Relationship status: Never     Intimate partner violence:     Fear of current or ex partner: No     Emotionally abused: No     Physically abused: No     Forced sexual activity: No   Other Topics Concern    Not on file   Social History Narrative    Daily caffeine consumption    Inadequate exercise    Parent       Current Outpatient Medications   Medication Sig Dispense Refill    albuterol (VENTOLIN HFA) 90 mcg/act inhaler Inhale 2 puffs every 6 (six) hours as needed for wheezing 18 g 0    LATUDA 20 MG tablet   0    hydrOXYzine HCL (ATARAX) 25 mg tablet Take 1 tablet (25 mg total) by mouth 3 (three) times a day as needed for anxiety for up to 30 days 30 tablet 2    ketotifen (ZADITOR) 0 025 % ophthalmic solution Administer 1 drop to both eyes 2 (two) times a day as needed (itchy eyes) for up to 90 days 5 mL 2    loratadine (CLARITIN) 10 mg tablet Take 1 tablet (10 mg total) by mouth daily for 180 days 90 tablet 1    methocarbamol (ROBAXIN) 750 mg tablet Take 1 tablet (750 mg total) by mouth 2 (two) times a day as needed for muscle spasms for up to 15 days 30 tablet 0    montelukast (SINGULAIR) 10 mg tablet Take 1 tablet (10 mg total) by mouth daily at bedtime for 90 days 90 tablet 0    naproxen (NAPROSYN) 500 mg tablet Take 1 tablet (500 mg total) by mouth 2 (two) times a day with meals for 20 days 40 tablet 0    ranitidine (ZANTAC) 150 mg tablet Take 0 5 tablets (75 mg total) by mouth 2 (two) times a day as needed for heartburn for up to 30 days 60 tablet 0     No current facility-administered medications for this visit  Allergies: Dust mite extract; Pollen extract; and Short ragweed pollen ext    ROS:  Positive for weight gain, cough and orthopedic complaints noted above  The remaining 9/12 systems on the intake sheet that I reviewed were negative  PE:  Ht 5' 6" (1 676 m)   Wt 90 7 kg (200 lb)   LMP 02/03/2020   BMI 32 28 kg/m²    Constitutional: The patient was  oriented to person, place, and time  Well-developed and well-nourished  In no acute distress  HEENT: Vision intact  Hearing normal  Swallowing normal   Head: Normocephalic  Cardiovascular: Intact distal pulses  Pulse regular  Pulmonary/Chest: Effort normal  No respiratory distress  Neurological: Alert and oriented to person, place, and time  Skin: Skin is warm  Psychiatric: Normal mood and affect  Ortho Exam: On today's exam of the right foot, she was not wearing her splint or using any external walking aids  She was wearing sandals  There was some mild swelling over the lateral ankle and lateral portion of the foot  There was no ecchymosis, redness or signs of infection  The skin was warm, dry and well perfused  She was non-tender over the lateral ankle ligaments or proximal fibular head  She was tender over the base of the 5th metatarsal  She could actively flex and extend her toes  She had good ankle motion  Sensation was intact to light touch with good capillary refill in all toes  There was no calf tenderness, popliteal adenopathy or cellulitis noted  Studies reviewed:  I personally reviewed right foot x-rays as well as the radiologist's report  There is a transverse nondisplaced fracture of the base of the right 5th metatarsal   This is not in the proximal shaft      Scribe Attestation    I,:   Pamella Andrew MA am acting as a scribe while in the presence of the attending physician :        I,:   Erika Marie MD personally performed the services described in this documentation    as scribed in my presence :

## 2020-02-28 NOTE — PROGRESS NOTES
Chief Complaint   Patient presents with    Right Foot - Pain           Assessment:  Fracture right 5th metatarsal base    Plan :  I explained to the patient she has a fracture at the base of the right 5th metatarsal   This does not need surgery, but is notoriously a slow fracture to heal   Unfortunately, she is on her feet full-time doing excessive walking and this is going to prolong, rather than hasten, her recovery  She has been working for 2 weeks and can continue working  I fitted her with a Tubigrip compression stocking and a hard-soled shoe to wear when she is up and around  She was given a cane to use in the opposite left hand to unload the right foot  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  I gave her off for 3 days to get the swelling down and I told her about elevation as much as possible  She can put ice on the foot for 15 minutes 4 times a day if needed and take Advil, Aleve, or Tylenol if needed for pain  I will see her in 1 month more for repeat x-ray and progression of her work activities    HPI:  This is a 27-year-old  female presenting for orthopedic evaluation regarding her right foot injury, she is referred by the ED  She states that on 02/18/2020 she inverted her foot when she tripped on son's toys  Two days later she had a recurrence of the same injury  She presented to the ED on 02/25/2020 to be evaluated  X-rays were performed and she was diagnosed with a base of 5th metatarsal fracture of the right foot  She was splinted but admits she only wore this for one day  She is a single mom and needed to drive and go to work  She is a  and is required to inspect 4 floors, on her feet and walking all day  He pain is localized over the lateral foot  She also complaints of stiffness of the toes       PMHx:         Past Medical History:   Diagnosis Date    Asthma     GERD (gastroesophageal reflux disease) last assessed: 2014    Migraine     Pregnancy induced hypertension        Past Surgical History:   Procedure Laterality Date     SECTION      x 3    INDUCED       TUBAL LIGATION         Family History   Problem Relation Age of Onset    Asthma Mother     Migraines Mother     Osteoarthritis Mother     Cervical cancer Mother     Hypertension Mother     Arthritis Father     Diabetes Father     No Known Problems Sister     No Known Problems Brother     Asthma Daughter     Asthma Son     Diabetes Maternal Grandmother     Heart failure Maternal Grandmother     Arthritis Maternal Grandmother        Social History     Socioeconomic History    Marital status: Single     Spouse name: Not on file    Number of children: 3    Years of education: Not on file    Highest education level: Not on file   Occupational History    Not on file   Social Needs    Financial resource strain: Somewhat hard    Food insecurity:     Worry: Sometimes true     Inability: Sometimes true    Transportation needs:     Medical: No     Non-medical: No   Tobacco Use    Smoking status: Never Smoker    Smokeless tobacco: Never Used   Substance and Sexual Activity    Alcohol use: Not Currently     Frequency: Monthly or less     Binge frequency: Never     Comment: social    Drug use: Never    Sexual activity: Yes     Partners: Male     Birth control/protection: None   Lifestyle    Physical activity:     Days per week: 0 days     Minutes per session: 0 min    Stress: Very much   Relationships    Social connections:     Talks on phone: Twice a week     Gets together: Once a week     Attends Sabianism service: Never     Active member of club or organization: No     Attends meetings of clubs or organizations: Never     Relationship status: Never     Intimate partner violence:     Fear of current or ex partner: No     Emotionally abused: No     Physically abused: No     Forced sexual activity: No Other Topics Concern    Not on file   Social History Narrative    Daily caffeine consumption    Inadequate exercise    Parent       Current Outpatient Medications   Medication Sig Dispense Refill    albuterol (VENTOLIN HFA) 90 mcg/act inhaler Inhale 2 puffs every 6 (six) hours as needed for wheezing 18 g 0    LATUDA 20 MG tablet   0    hydrOXYzine HCL (ATARAX) 25 mg tablet Take 1 tablet (25 mg total) by mouth 3 (three) times a day as needed for anxiety for up to 30 days 30 tablet 2    ketotifen (ZADITOR) 0 025 % ophthalmic solution Administer 1 drop to both eyes 2 (two) times a day as needed (itchy eyes) for up to 90 days 5 mL 2    loratadine (CLARITIN) 10 mg tablet Take 1 tablet (10 mg total) by mouth daily for 180 days 90 tablet 1    methocarbamol (ROBAXIN) 750 mg tablet Take 1 tablet (750 mg total) by mouth 2 (two) times a day as needed for muscle spasms for up to 15 days 30 tablet 0    montelukast (SINGULAIR) 10 mg tablet Take 1 tablet (10 mg total) by mouth daily at bedtime for 90 days 90 tablet 0    naproxen (NAPROSYN) 500 mg tablet Take 1 tablet (500 mg total) by mouth 2 (two) times a day with meals for 20 days 40 tablet 0    ranitidine (ZANTAC) 150 mg tablet Take 0 5 tablets (75 mg total) by mouth 2 (two) times a day as needed for heartburn for up to 30 days 60 tablet 0     No current facility-administered medications for this visit  Allergies: Dust mite extract; Pollen extract; and Short ragweed pollen ext    ROS:  Positive for weight gain, cough and orthopedic complaints noted above  The remaining 9/12 systems on the intake sheet that I reviewed were negative  PE:  Ht 5' 6" (1 676 m)   Wt 90 7 kg (200 lb)   LMP 02/03/2020   BMI 32 28 kg/m²   Constitutional: The patient was  oriented to person, place, and time  Well-developed and well-nourished  In no acute distress  HEENT: Vision intact  Hearing normal  Swallowing normal   Head: Normocephalic     Cardiovascular: Intact distal pulses  Pulse regular  Pulmonary/Chest: Effort normal  No respiratory distress  Neurological: Alert and oriented to person, place, and time  Skin: Skin is warm  Psychiatric: Normal mood and affect  Ortho Exam: On today's exam of the right foot, she was not wearing her splint or using any external walking aids  She was wearing sandals  There was some mild swelling over the lateral ankle and lateral portion of the foot  There was no ecchymosis, redness or signs of infection  The skin was warm, dry and well perfused  She was non-tender over the lateral ankle ligaments or proximal fibular head  She was tender over the base of the 5th metatarsal  She could actively flex and extend her toes  She had good ankle motion  Sensation was intact to light touch with good capillary refill in all toes  There was no calf tenderness, popliteal adenopathy or cellulitis noted  Studies reviewed:  I personally reviewed right foot x-rays as well as the radiologist's report  There is a transverse nondisplaced fracture of the base of the right 5th metatarsal   This is not in the proximal shaft      Scribe Attestation    I,:   Vasquez Tello MA am acting as a scribe while in the presence of the attending physician :        I,:   Alma Lazo MD personally performed the services described in this documentation    as scribed in my presence :

## 2020-02-28 NOTE — PATIENT INSTRUCTIONS
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

## 2020-02-28 NOTE — LETTER
February 28, 2020     Patient: Glendy Farley   YOB: 1985   Date of Visit: 2/28/2020       To Whom it May Concern:    Glendy Farley is under my professional care  She was seen in my office on 2/28/2020  She has a fracture of the base of her right foot which will take another 4-6 weeks to totally heal   She may continue working, but I fitted her with a hard-soled shoe and a cane in the opposite left hand to take the pressure off this bone  She has quite a bit of swelling therefore I would like to keep her out of work for the next 3 days and keep the leg elevated full-time  I started her on ice 15 minutes 4 times a day and mild pain medicine as needed  She may return to work on 04/02/2020 and the only accommodation would be to allow her to use the elevator to change floors rather than going up and down stairs  I will see her in 1 month for repeat x-ray and progression of her work status  If you have any questions or concerns, please don't hesitate to call           Sincerely,          Anthony Suggs MD        CC: Glendy Farley

## 2020-04-03 ENCOUNTER — TELEPHONE (OUTPATIENT)
Dept: OBGYN CLINIC | Facility: HOSPITAL | Age: 35
End: 2020-04-03

## 2021-01-27 ENCOUNTER — HOSPITAL ENCOUNTER (EMERGENCY)
Facility: HOSPITAL | Age: 36
Discharge: HOME | End: 2021-01-27
Attending: EMERGENCY MEDICINE | Admitting: EMERGENCY MEDICINE
Payer: COMMERCIAL

## 2021-01-27 VITALS
RESPIRATION RATE: 18 BRPM | DIASTOLIC BLOOD PRESSURE: 83 MMHG | HEART RATE: 66 BPM | SYSTOLIC BLOOD PRESSURE: 158 MMHG | TEMPERATURE: 97.9 F | OXYGEN SATURATION: 100 %

## 2021-01-27 DIAGNOSIS — B34.9 VIRAL SYNDROME: Primary | ICD-10-CM

## 2021-01-27 DIAGNOSIS — Z11.52 ENCOUNTER FOR SCREENING FOR COVID-19: ICD-10-CM

## 2021-01-27 DIAGNOSIS — R52 GENERALIZED BODY ACHES: ICD-10-CM

## 2021-01-27 LAB — SARS-COV-2 RNA RESP QL NAA+PROBE: POSITIVE

## 2021-01-27 PROCEDURE — 87635 SARS-COV-2 COVID-19 AMP PRB: CPT | Performed by: EMERGENCY MEDICINE

## 2021-01-27 PROCEDURE — 99284 EMERGENCY DEPT VISIT MOD MDM: CPT | Performed by: EMERGENCY MEDICINE

## 2021-01-27 PROCEDURE — 99283 EMERGENCY DEPT VISIT LOW MDM: CPT

## 2021-01-27 NOTE — ED ATTENDING ATTESTATION
1/27/2021  IEnzo MD, saw and evaluated the patient  I have discussed the patient with the resident/non-physician practitioner and agree with the resident's/non-physician practitioner's findings, Plan of Care, and MDM as documented in the resident's/non-physician practitioner's note, except where noted  All available labs and Radiology studies were reviewed  I was present for key portions of any procedure(s) performed by the resident/non-physician practitioner and I was immediately available to provide assistance  At this point I agree with the current assessment done in the Emergency Department  I have conducted an independent evaluation of this patient a history and physical is as follows:    ED Course         Critical Care Time  Procedures    31 yo female with anosmia for two days, exposure to covid and body aches  No fever, no cough  No n/v/d   pmh asthma  Vss, afebrile, lungs cta, rrr, abdomen soft nontender, no pharyngeal erythema  covid swab

## 2021-01-27 NOTE — DISCHARGE INSTRUCTIONS

## 2021-01-27 NOTE — Clinical Note
Myrtle Sparrow was seen and treated in our emergency department on 1/27/2021  Diagnosis:     Annamarie Diana  may return to work on return date  She may return on this date: 02/08/2021         If you have any questions or concerns, please don't hesitate to call        Kristine Ansari MD    ______________________________           _______________          _______________  Hospital Representative                              Date                                Time

## 2021-01-28 NOTE — ED PROVIDER NOTES
Final Diagnosis:  1  Viral syndrome    2  Generalized body aches    3  Encounter for screening for COVID-19         Chief Complaint   Patient presents with    Generalized Body Aches     high risk covid exposure  here for test       ASSESSMENT + PLAN:   - Nursing note reviewed  1  Covid R/O  -Patient currently stable from an airway perspective with appropriate ambulatory pulse ox  -Covid-19 swab  -Symptomatic control    This patient was possibly exposed to Covid-19 and so we will test the patient for Covid-19  Ambulatory and resting pulse ox stable  Patient is currently symptomatic  We will provide appropriate quarantine instructions  The patient will be notified in the next couple days whether not the test is positive  They are to return to the emergency department if they have worsening SOB or any other concerning symptoms  Final Dispo   Patient was reassessed  Vital signs stable  Patient and/or family given discharge instructions and return precautions  Patient and/or family was reassured  The patient and/or family vocalizes understanding  Answered all of the patient's and/or family's questions  Will follow up with PCP (number provided)  Patient and/or family are agreeable to the plan            Medications - No data to display  Time reflects when diagnosis was documented in both MDM as applicable and the Disposition within this note     Time User Action Codes Description Comment    1/27/2021  4:53 PM Lennox Chick Add [M79 10] Myalgia     1/27/2021  4:53 PM Yaron Hensley Add [R52] Generalized body aches     1/27/2021  4:53 PM Yaron Hensley Modify [R52] Generalized body aches     1/27/2021  4:53 PM Manasa Side [R11 80] Myalgia     1/27/2021  4:53 PM Yaron Smith Add [B34 9] Viral syndrome     1/27/2021  4:53 PM Lennox Chick Modify [R52] Generalized body aches     1/27/2021  4:53 PM Lennox Chick Modify [B34 9] Viral syndrome     1/27/2021  4:53 PM Lennox Chick Add [Z11 52] Encounter for screening for COVID-19       ED Disposition     ED Disposition Condition Date/Time Comment    Discharge Stable Wed Jan 27, 2021  4:53 PM Chantal Rubinstein discharge to home/self care  Follow-up Information     Follow up With Specialties Details Why Contact Info    [employer]  Call   [phone]          Discharge Medication List as of 1/27/2021  4:54 PM      CONTINUE these medications which have NOT CHANGED    Details   naproxen (NAPROSYN) 500 mg tablet Take 1 tablet (500 mg total) by mouth 2 (two) times a day with meals for 20 days, Starting Thu 10/31/2019, Until Wed 11/20/2019, Normal      ranitidine (ZANTAC) 150 mg tablet Take 0 5 tablets (75 mg total) by mouth 2 (two) times a day as needed for heartburn for up to 30 days, Starting Mon 3/11/2019, Until Wed 4/10/2019, Normal      ZADITOR 0 025 % ophthalmic solution instill 1 drop into both eyes twice a day if needed for ITCHY EYES FOR UP TO 90 DAYS, Historical Med           No discharge procedures on file  Prior to Admission Medications   Prescriptions Last Dose Informant Patient Reported? Taking? ZADITOR 0 025 % ophthalmic solution   Yes No   Sig: instill 1 drop into both eyes twice a day if needed for ITCHY EYES FOR UP TO 90 DAYS   naproxen (NAPROSYN) 500 mg tablet   No No   Sig: Take 1 tablet (500 mg total) by mouth 2 (two) times a day with meals for 20 days   ranitidine (ZANTAC) 150 mg tablet   No No   Sig: Take 0 5 tablets (75 mg total) by mouth 2 (two) times a day as needed for heartburn for up to 30 days      Facility-Administered Medications: None       History of Present Illness: This is a 30 y o  female presenting today for evaluation of possible Covid-19  Patient was recently exposed to someone at work who had Covid-19  She says that she has had some myalgias, chills, and generally just some feelings of being unwell  She also has anosmia and ageusia here    She says that she is eating more salt on her food and feels like she cannot taste   Denies any medical problems other than asthma  No real shortness of breath  No chest pain  Patient says that she has not had any recent travel or antibiotics  - No language barrier    - History obtained from patient and chart   - There are no limitations to the history obtained  - Previous charting was reviewed  Some data reviewed included below for ease of access whether or not it is relevant to this patient encounter  Past Medical, Past Surgical History:    has a past medical history of Asthma, GERD (gastroesophageal reflux disease), Migraine, and Pregnancy induced hypertension  has a past surgical history that includes  section; Tubal ligation; and Induced   Allergies: Allergies   Allergen Reactions    Dust Mite Extract     Pollen Extract     Short Ragweed Pollen Ext        Social and Family History:     Social History     Substance and Sexual Activity   Alcohol Use Not Currently    Frequency: Monthly or less    Binge frequency: Never    Comment: social     Social History     Tobacco Use   Smoking Status Never Smoker   Smokeless Tobacco Never Used     Social History     Substance and Sexual Activity   Drug Use Never       Review of Systems:   Review of Systems   Constitutional: Positive for chills and fever  Negative for diaphoresis  HENT: Negative for ear pain and sore throat  Eyes: Negative for pain and visual disturbance  Respiratory: Negative for cough and shortness of breath  Cardiovascular: Negative for chest pain and palpitations  Gastrointestinal: Positive for diarrhea  Negative for abdominal pain and vomiting  Genitourinary: Negative for dysuria and hematuria  Musculoskeletal: Positive for arthralgias and myalgias  Negative for back pain  Skin: Negative for color change and rash  Neurological: Negative for seizures, syncope and headaches  All other systems reviewed and are negative         Physical Examination     Vitals:    21 1624 01/27/21 1630   BP:  158/83   Pulse:  66   Resp: 18    Temp: 97 9 °F (36 6 °C)    TempSrc: Tympanic    SpO2:  100%     Vitals reviewed by me  Physical Exam  Vitals signs and nursing note reviewed  Constitutional:       Appearance: She is well-developed  She is not ill-appearing  Comments: Sick, but not toxic   HENT:      Head: Normocephalic and atraumatic  Nose: Congestion present  Mouth/Throat:      Pharynx: No oropharyngeal exudate or posterior oropharyngeal erythema  Eyes:      Conjunctiva/sclera: Conjunctivae normal    Neck:      Musculoskeletal: Normal range of motion and neck supple  Cardiovascular:      Rate and Rhythm: Normal rate and regular rhythm  Pulmonary:      Effort: Pulmonary effort is normal       Breath sounds: Normal breath sounds  Abdominal:      General: There is no distension  Palpations: Abdomen is soft  Tenderness: There is no abdominal tenderness  Musculoskeletal: Normal range of motion  Skin:     General: Skin is warm and dry  Neurological:      Mental Status: She is alert and oriented to person, place, and time  Comments: Grossly neuro intact; Able to move all extremities   Psychiatric:         Mood and Affect: Mood normal                               No orders to display     Orders Placed This Encounter   Procedures    Novel Coronavirus (Covid-19),PCR [facility]       Labs:   Labs Reviewed - No data to display    Imaging:   No results found  Final Diagnosis:  1  Viral syndrome    2  Generalized body aches    3  Encounter for screening for COVID-19        Code Status: No Order    Portions of the record may have been created with voice recognition software  Occasional wrong word or "sound a like" substitutions may have occurred due to the inherent limitations of voice recognition software  Read the chart carefully and recognize, using context, where substitutions have occurred      Electronically signed by:  Tito Whitehead, PGY 2, MD Delilah Mcfarlane MD  01/27/21 0788

## 2021-01-28 NOTE — RESULT ENCOUNTER NOTE
Called patient at [phone]  Patient name and date of birth use is positive patient identifiers  Aware of positive test result  Reviewed vitamins that may help with symptoms  Patient instructed to bring quarantine for 10 days and return to the emergency department with any worsening shortness of breath

## 2021-02-27 ENCOUNTER — APPOINTMENT (EMERGENCY)
Dept: RADIOLOGY | Facility: HOSPITAL | Age: 36
End: 2021-02-27
Payer: COMMERCIAL

## 2021-02-27 ENCOUNTER — HOSPITAL ENCOUNTER (EMERGENCY)
Facility: HOSPITAL | Age: 36
Discharge: HOME | End: 2021-02-27
Attending: EMERGENCY MEDICINE
Payer: COMMERCIAL

## 2021-02-27 VITALS
HEART RATE: 95 BPM | OXYGEN SATURATION: 97 % | RESPIRATION RATE: 18 BRPM | DIASTOLIC BLOOD PRESSURE: 55 MMHG | SYSTOLIC BLOOD PRESSURE: 102 MMHG | TEMPERATURE: 98 F

## 2021-02-27 DIAGNOSIS — S61.411A LACERATION OF RIGHT HAND: Primary | ICD-10-CM

## 2021-02-27 PROCEDURE — 73130 X-RAY EXAM OF HAND: CPT

## 2021-02-27 PROCEDURE — 99282 EMERGENCY DEPT VISIT SF MDM: CPT | Performed by: EMERGENCY MEDICINE

## 2021-02-27 PROCEDURE — 12002 RPR S/N/AX/GEN/TRNK2.6-7.5CM: CPT | Performed by: EMERGENCY MEDICINE

## 2021-02-27 PROCEDURE — 90715 TDAP VACCINE 7 YRS/> IM: CPT | Performed by: EMERGENCY MEDICINE

## 2021-02-27 PROCEDURE — 99283 EMERGENCY DEPT VISIT LOW MDM: CPT

## 2021-02-27 PROCEDURE — 90471 IMMUNIZATION ADMIN: CPT

## 2021-02-27 RX ORDER — LIDOCAINE HYDROCHLORIDE 10 MG/ML
5 INJECTION, SOLUTION EPIDURAL; INFILTRATION; INTRACAUDAL; PERINEURAL ONCE
Status: COMPLETED | OUTPATIENT
Start: 2021-02-27 | End: 2021-02-27

## 2021-02-27 RX ORDER — ACETAMINOPHEN 325 MG/1
975 TABLET ORAL ONCE
Status: COMPLETED | OUTPATIENT
Start: 2021-02-27 | End: 2021-02-27

## 2021-02-27 RX ADMIN — LIDOCAINE HYDROCHLORIDE 5 ML: 10 INJECTION, SOLUTION EPIDURAL; INFILTRATION; INTRACAUDAL; PERINEURAL at 12:08

## 2021-02-27 RX ADMIN — ACETAMINOPHEN 975 MG: 325 TABLET, FILM COATED ORAL at 11:34

## 2021-02-27 RX ADMIN — TETANUS TOXOID, REDUCED DIPHTHERIA TOXOID AND ACELLULAR PERTUSSIS VACCINE, ADSORBED 0.5 ML: 5; 2.5; 8; 8; 2.5 SUSPENSION INTRAMUSCULAR at 11:34

## 2021-02-27 NOTE — ED PROCEDURE NOTE
Procedure  Laceration repair    Date/Time: 2/27/2021 12:56 PM  Performed by: Jacob Weiner MD  Authorized by: Jacob Weiner MD   Consent: Verbal consent obtained  Consent given by: patient  Radiology Images displayed and confirmed   If images not available, report reviewed: imaging studies available  Patient identity confirmed: verbally with patient, arm band and hospital-assigned identification number  Body area: upper extremity  Location details: right hand  Laceration length: 3 cm  Anesthesia: local infiltration    Anesthesia:  Local Anesthetic: lidocaine 1% without epinephrine    Wound Dehiscence:  Superficial Wound Dehiscence: simple closure      Procedure Details:  Irrigation solution: tap water  Amount of cleaning: extensive  Skin closure: 4-0 Prolene  Number of sutures: 9  Approximation: close  Approximation difficulty: simple  Dressing: 4x4 sterile gauze  Patient tolerance: patient tolerated the procedure well with no immediate complications                       Jacob Weiner MD  02/27/21 [id number]

## 2021-02-27 NOTE — Clinical Note
Ponce De Leonisaiah Montese was seen and treated in our emergency department on 2/27/2021  Diagnosis:     Vanesa Gray  may return to work on return date  She may return on this date: 03/01/2021         If you have any questions or concerns, please don't hesitate to call        Claudine Juan MD    ______________________________           _______________          _______________  Hospital Representative                              Date                                Time

## 2021-02-27 NOTE — ED ATTENDING ATTESTATION
2/27/2021  IAraceli DO, saw and evaluated the patient  I have discussed the patient with the resident/non-physician practitioner and agree with the resident's/non-physician practitioner's findings, Plan of Care, and MDM as documented in the resident's/non-physician practitioner's note, except where noted  All available labs and Radiology studies were reviewed  I was present for key portions of any procedure(s) performed by the resident/non-physician practitioner and I was immediately available to provide assistance  At this point I agree with the current assessment done in the Emergency Department  I have conducted an independent evaluation of this patient a history and physical is as follows: [de-identified] for a female presents for right hand laceration  Slipped on her head urine fell broke mirror with hand yesterday  Presents today for closure  Tetanus up-to-date  No tendon involved  Looks well    Plan is primary repair with sutures after irrigation    ED Course         Critical Care Time  Procedures

## 2021-02-27 NOTE — ED PROVIDER NOTES
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  She has paresthesias in all fingertips  Neurovasc intact  ROM full  She believes her TDAP to be uptodate as of one year ago  On chart review it hasn't been since     - No language barrier    - History obtained from patient  - There are no limitations to the history obtained  - Previous charting underwent limited review with attention to labs, ekgs, and prior imaging  PMH:   has a past medical history of Asthma, GERD (gastroesophageal reflux disease), Migraine, and Pregnancy induced hypertension  PSH:   has a past surgical history that includes  section; Tubal ligation; and Induced   ROS:  Review of Systems   Constitutional: Negative for activity change, appetite change, chills, diaphoresis, fatigue and fever  HENT: Negative for congestion, facial swelling, mouth sores, rhinorrhea, sinus pain, sneezing, sore throat, trouble swallowing and voice change  Eyes: Negative for photophobia and visual disturbance  Respiratory: Negative for cough, chest tightness, shortness of breath, wheezing and stridor  Cardiovascular: Negative for chest pain, palpitations and leg swelling  Gastrointestinal: Negative for abdominal distention, abdominal pain, blood in stool, constipation, diarrhea, nausea and vomiting  Genitourinary: Negative for decreased urine volume, difficulty urinating, dysuria, flank pain, frequency, menstrual problem, pelvic pain, vaginal bleeding and vaginal discharge  Musculoskeletal: Positive for joint swelling   Negative for arthralgias, gait problem, neck pain and neck stiffness  Skin: Positive for wound  Negative for color change and rash  Neurological: Negative for dizziness, syncope, facial asymmetry, speech difficulty, weakness, light-headedness, numbness and headaches  Psychiatric/Behavioral: Negative for confusion, self-injury and suicidal ideas  The patient is not nervous/anxious  PE:   Vitals:    02/27/21 1058   BP: 102/55   BP Location: Left arm   Pulse: 95   Resp: 18   Temp: 98 °F (36 7 °C)   TempSrc: Oral   SpO2: 97%     Vitals reviewed by me  Physical Exam  Vitals signs and nursing note reviewed  Constitutional:       General: She is not in acute distress  Appearance: She is well-developed  She is not diaphoretic  HENT:      Head: Normocephalic and atraumatic  Right Ear: External ear normal       Left Ear: External ear normal       Nose: Nose normal    Eyes:      General: No scleral icterus  Conjunctiva/sclera: Conjunctivae normal       Pupils: Pupils are equal, round, and reactive to light  Neck:      Musculoskeletal: Normal range of motion and neck supple  Cardiovascular:      Rate and Rhythm: Normal rate and regular rhythm  Heart sounds: Normal heart sounds  No murmur  Pulmonary:      Effort: Pulmonary effort is normal  No respiratory distress  Breath sounds: Normal breath sounds  No stridor  No wheezing or rales  Abdominal:      General: Bowel sounds are normal  There is no distension  Palpations: Abdomen is soft  There is no mass  Tenderness: There is no abdominal tenderness  There is no guarding or rebound  Musculoskeletal: Normal range of motion  General: Tenderness present  No deformity  Lymphadenopathy:      Cervical: No cervical adenopathy  Skin:     General: Skin is warm and dry  Capillary Refill: Capillary refill takes less than 2 seconds  Findings: Lesion present  No rash     Neurological:      Mental Status: She is alert and oriented to person, place, and time  Cranial Nerves: No cranial nerve deficit  Sensory: No sensory deficit  Psychiatric:         Behavior: Behavior normal          Thought Content: Thought content normal          Judgment: Judgment normal           A:  - Nursing note reviewed  Differential includes but not limited to laceration, underlying fx                      XR hand 3+ views RIGHT   Final Result      No acute osseous abnormality  Workstation performed: [id number]           Orders Placed This Encounter   Procedures    Laceration repair    XR hand 3+ views RIGHT     Labs Reviewed - No data to display    Final Diagnosis:  1  Laceration of right hand        P:  - patient to be treated at home with tylenol motrin  - patient to follow with PCP in 7d for suture removal   - patient will call their PCP   to let them know they were in the emergency department  We discuss return precautions, including needs to call 911 vs returning to ED by private vehicle  Patient expresses understanding and comfort with discharge  Return precautions provided for signs of wound infection    Medications   tetanus-diphtheria-acellular pertussis (BOOSTRIX) IM injection 0 5 mL (0 5 mL Intramuscular Given 2/27/21 1134)   lidocaine (PF) (XYLOCAINE-MPF) 1 % injection 5 mL (5 mL Infiltration Given 2/27/21 1208)   acetaminophen (TYLENOL) tablet 975 mg (975 mg Oral Given 2/27/21 1134)     Time reflects when diagnosis was documented in both MDM as applicable and the Disposition within this note     Time User Action Codes Description Comment    2/27/2021 12:55 PM Alda Alpers Add [Q97 741O] Laceration of right hand     2/27/2021 12:55 PM Alda Alpers Modify [G50 [id number]] Laceration of right hand multiple over [facility]      ED Disposition     ED Disposition Condition Date/Time Comment    Discharge Stable Sat Feb 27, 2021 12:55 PM Laura Pacheco discharge to home/self care              Follow-up Information     Follow up With Specialties Details Why Contact Info Additional [facility] Internal Medicine Schedule an appointment as soon as possible for a visit  For suture removal in 7 d [address] [address]  [facility] [address], [address], [address] [phone]        Discharge Medication List as of 2/27/2021 12:56 PM      CONTINUE these medications which have NOT CHANGED    Details   naproxen (NAPROSYN) 500 mg tablet Take 1 tablet (500 mg total) by mouth 2 (two) times a day with meals for 20 days, Starting Thu 10/31/2019, Until Wed 11/20/2019, Normal      ranitidine (ZANTAC) 150 mg tablet Take 0 5 tablets (75 mg total) by mouth 2 (two) times a day as needed for heartburn for up to 30 days, Starting Mon 3/11/2019, Until Wed 4/10/2019, Normal      ZADITOR 0 025 % ophthalmic solution instill 1 drop into both eyes twice a day if needed for ITCHY EYES FOR UP TO 90 DAYS, Historical Med           No discharge procedures on file  Prior to Admission Medications   Prescriptions Last Dose Informant Patient Reported? Taking? ZADITOR 0 025 % ophthalmic solution   Yes No   Sig: instill 1 drop into both eyes twice a day if needed for ITCHY EYES FOR UP TO 90 DAYS   naproxen (NAPROSYN) 500 mg tablet   No No   Sig: Take 1 tablet (500 mg total) by mouth 2 (two) times a day with meals for 20 days   ranitidine (ZANTAC) 150 mg tablet   No No   Sig: Take 0 5 tablets (75 mg total) by mouth 2 (two) times a day as needed for heartburn for up to 30 days      Facility-Administered Medications: None       Portions of the record may have been created with voice recognition software  Occasional wrong word or "sound a like" substitutions may have occurred due to the inherent limitations of voice recognition software   Read the chart carefully and recognize, using context, where substitutions have occurred      Electronically signed by:  Mally Clakr, PGY 2, MD Cheyenne Monroy MD  02/28/21 8372

## 2021-04-25 ENCOUNTER — HOSPITAL ENCOUNTER (EMERGENCY)
Facility: HOSPITAL | Age: 36
Discharge: HOME | End: 2021-04-25
Attending: EMERGENCY MEDICINE | Admitting: EMERGENCY MEDICINE
Payer: COMMERCIAL

## 2021-04-25 ENCOUNTER — TELEPHONE (OUTPATIENT)
Dept: OTHER | Facility: OTHER | Age: 36
End: 2021-04-25

## 2021-04-25 VITALS
TEMPERATURE: 97.5 F | SYSTOLIC BLOOD PRESSURE: 111 MMHG | DIASTOLIC BLOOD PRESSURE: 80 MMHG | BODY MASS INDEX: 28.73 KG/M2 | RESPIRATION RATE: 16 BRPM | HEART RATE: 77 BPM | OXYGEN SATURATION: 100 % | WEIGHT: 178 LBS

## 2021-04-25 DIAGNOSIS — Z20.822 ENCOUNTER FOR LABORATORY TESTING FOR COVID-19 VIRUS: ICD-10-CM

## 2021-04-25 DIAGNOSIS — R05.9 COUGH: Primary | ICD-10-CM

## 2021-04-25 DIAGNOSIS — R06.02 SHORTNESS OF BREATH: ICD-10-CM

## 2021-04-25 LAB — SARS-COV-2 RNA RESP QL NAA+PROBE: POSITIVE

## 2021-04-25 PROCEDURE — 99283 EMERGENCY DEPT VISIT LOW MDM: CPT

## 2021-04-25 PROCEDURE — 99284 EMERGENCY DEPT VISIT MOD MDM: CPT | Performed by: EMERGENCY MEDICINE

## 2021-04-25 PROCEDURE — U0003 INFECTIOUS AGENT DETECTION BY NUCLEIC ACID (DNA OR RNA); SEVERE ACUTE RESPIRATORY SYNDROME CORONAVIRUS 2 (SARS-COV-2) (CORONAVIRUS DISEASE [COVID-19]), AMPLIFIED PROBE TECHNIQUE, MAKING USE OF HIGH THROUGHPUT TECHNOLOGIES AS DESCRIBED BY CMS-2020-01-R: HCPCS | Performed by: EMERGENCY MEDICINE

## 2021-04-25 PROCEDURE — U0005 INFEC AGEN DETEC AMPLI PROBE: HCPCS | Performed by: EMERGENCY MEDICINE

## 2021-04-25 RX ORDER — ACETAMINOPHEN 325 MG/1
975 TABLET ORAL ONCE
Status: COMPLETED | OUTPATIENT
Start: 2021-04-25 | End: 2021-04-25

## 2021-04-25 RX ADMIN — ACETAMINOPHEN 975 MG: 325 TABLET, COATED ORAL at 19:41

## 2021-04-25 NOTE — DISCHARGE INSTRUCTIONS
Follow-up with PCP for further care  Clinic info provided below if needed  Use over the counter medications as stated on the bottle as needed for symptom control  Follow- up to date CDC COVID guidelines  Return to the ED with new or worsening symptoms

## 2021-04-25 NOTE — ED PROVIDER NOTES
History  Chief Complaint   Patient presents with    Cough     Pt reports cough, SOB, sore throat, chills, and headache for 3 days     Patient is a 29-year-old female who presents for cough, chest tightness, headache, and chills  Patient states he has made going on for 2 days  Patient's main concern is that she and her children may have been exposed to [place]  Patient states that 2 of her co-workers recently tested positive as well as her boyfriend's grandmother who they interact with frequently  Patient states that her cough is nonproductive and she has mild shortness of breath and chest tightness  Patient also stating that she has a frontal headache  Patient has not been taking anything for her symptoms  Patient states that she is otherwise healthy aside from asthma  Patient states she has an inhaler to use as needed but has not used it recently  Patient denies any change in symptoms when being out of the house for prolonged period of time  Patient denies any environmental exposures  Prior to Admission Medications   Prescriptions Last Dose Informant Patient Reported? Taking?    ZADITOR 0 025 % ophthalmic solution   Yes No   Sig: instill 1 drop into both eyes twice a day if needed for ITCHY EYES FOR UP TO 90 DAYS   naproxen (NAPROSYN) 500 mg tablet   No No   Sig: Take 1 tablet (500 mg total) by mouth 2 (two) times a day with meals for 20 days   ranitidine (ZANTAC) 150 mg tablet   No No   Sig: Take 0 5 tablets (75 mg total) by mouth 2 (two) times a day as needed for heartburn for up to 30 days      Facility-Administered Medications: None       Past Medical History:   Diagnosis Date    Asthma     GERD (gastroesophageal reflux disease)     last assessed: 2014    Migraine     Pregnancy induced hypertension        Past Surgical History:   Procedure Laterality Date     SECTION      x 3    INDUCED       TUBAL LIGATION         Family History   Problem Relation Age of Onset    Asthma Mother     Migraines Mother     Osteoarthritis Mother     Cervical cancer Mother     Hypertension Mother     Arthritis Father     Diabetes Father     No Known Problems Sister     No Known Problems Brother     Asthma Daughter     Asthma Son     Diabetes Maternal Grandmother     Heart failure Maternal Grandmother     Arthritis Maternal Grandmother      I have reviewed and agree with the history as documented  E-Cigarette/Vaping    E-Cigarette Use Never User      E-Cigarette/Vaping Substances    Nicotine No     THC No     CBD No     Flavoring No     Other No     Unknown No      Social History     Tobacco Use    Smoking status: Never Smoker    Smokeless tobacco: Never Used   Substance Use Topics    Alcohol use: Not Currently     Frequency: Monthly or less     Binge frequency: Never     Comment: social    Drug use: Never        Review of Systems   Constitutional: Negative for chills and fever  HENT: Positive for congestion, sinus pressure and sore throat  Negative for sinus pain  Eyes: Negative for pain  Respiratory: Positive for cough, chest tightness and shortness of breath  Negative for wheezing  Cardiovascular: Negative for chest pain, palpitations and leg swelling  Gastrointestinal: Positive for abdominal pain (chronic; unchanged)  Negative for diarrhea, nausea and vomiting  Musculoskeletal: Negative for arthralgias, back pain, gait problem and neck pain  Skin: Negative for color change, pallor, rash and wound  Neurological: Positive for headaches  Negative for dizziness, weakness and light-headedness  Psychiatric/Behavioral: Negative for agitation, behavioral problems and confusion         Physical Exam  ED Triage Vitals [04/25/21 1803]   Temperature Pulse Respirations Blood Pressure SpO2   97 5 °F (36 4 °C) 77 16 111/80 100 %      Temp Source Heart Rate Source Patient Position - Orthostatic VS BP Location FiO2 (%)   Tympanic Monitor -- -- --      Pain Score 6             Orthostatic Vital Signs  Vitals:    04/25/21 1803   BP: 111/80   Pulse: 77       Physical Exam  Vitals signs reviewed  Constitutional:       General: She is not in acute distress  Appearance: Normal appearance  She is well-developed  She is not ill-appearing, toxic-appearing or diaphoretic  HENT:      Head: Normocephalic and atraumatic  Right Ear: External ear normal       Left Ear: External ear normal       Nose: Nose normal       Mouth/Throat:      Mouth: Mucous membranes are moist       Pharynx: Oropharynx is clear  Eyes:      Conjunctiva/sclera: Conjunctivae normal       Pupils: Pupils are equal, round, and reactive to light  Neck:      Musculoskeletal: Normal range of motion and neck supple  Cardiovascular:      Rate and Rhythm: Normal rate and regular rhythm  Heart sounds: Normal heart sounds  No murmur  Pulmonary:      Effort: Pulmonary effort is normal  No respiratory distress  Breath sounds: Normal breath sounds  No stridor  No wheezing or rhonchi  Abdominal:      General: Bowel sounds are normal       Palpations: Abdomen is soft  Tenderness: There is abdominal tenderness (chronic)  Musculoskeletal: Normal range of motion  Lymphadenopathy:      Cervical: Cervical adenopathy (L>R; tender) present  Skin:     General: Skin is warm and dry  Capillary Refill: Capillary refill takes less than 2 seconds  Neurological:      Mental Status: She is alert and oriented to person, place, and time  Psychiatric:         Mood and Affect: Mood normal          Behavior: Behavior normal          Thought Content:  Thought content normal          Judgment: Judgment normal          ED Medications  Medications   acetaminophen (TYLENOL) tablet 975 mg (975 mg Oral Given 4/25/21 1941)       Diagnostic Studies  Results Reviewed     Procedure Component Value Units Date/Time    Novel Coronavirus (Covid-19),PCR [facility] - 24 Hour Routine [[id number]] Collected: 04/25/21 1941 Lab Status: In process Specimen: Nares from Nose Updated: 04/25/21 1953                 No orders to display         Procedures  Procedures      ED Course                                       MDM  Number of Diagnoses or Management Options  Cough:   Encounter for laboratory testing for COVID-19 virus:   Shortness of breath:   Diagnosis management comments: Pt is a 31yo F who presents with headache, cough, and shortness of breath  Exam pertinent for cervical lymphadenopathy and clear lung sounds  Differential diagnosis to include but not limited to viral syndrome COVID versus other  Patient primarily looking for COVID testing at this time  Will order swab  Patient also agreeable to Tylenol for her headache  Patient is otherwise well appearing with normal vitals  Based on story, no concern for environmental exposure  Will plan for discharge with symptomatic treatment outpatient  Patient agreeable  Plan to discharge patient with follow-up to PCP  Discussed returning the ED with significant worsening of symptoms  Discussed use of over the counter medications as stated on the bottle as needed for symptom control  Pt expressed understanding of discharge instructions, return precautions, and medication instructions  All questions were answered and pt was discharged without incident              Amount and/or Complexity of Data Reviewed  Clinical lab tests: ordered        Disposition  Final diagnoses:   Cough   Shortness of breath   Encounter for laboratory testing for COVID-19 virus     Time reflects when diagnosis was documented in both MDM as applicable and the Disposition within this note     Time User Action Codes Description Comment    4/25/2021  7:14 PM Francy Nguyen Add [R05] Cough     4/25/2021  7:15 PM Francy Nguyen Add [R06 02] Shortness of breath     4/25/2021  7:15 PM Francy Nguyen Add [Z20 822] Encounter for laboratory testing for COVID-19 virus       ED Disposition     ED Disposition Condition Date/Time Comment    Discharge Stable Sun Apr 25, 2021  7:14 PM Ankur Pittman discharge to home/self care  Follow-up Information     Follow up With Specialties Details Why Contact Info Additional [address] Internal Medicine Call  As needed [address] [address]  [facility] [address], [address], [address] [phone]          Discharge Medication List as of 4/25/2021  7:17 PM      CONTINUE these medications which have NOT CHANGED    Details   naproxen (NAPROSYN) 500 mg tablet Take 1 tablet (500 mg total) by mouth 2 (two) times a day with meals for 20 days, Starting Thu 10/31/2019, Until Wed 11/20/2019, Normal      ranitidine (ZANTAC) 150 mg tablet Take 0 5 tablets (75 mg total) by mouth 2 (two) times a day as needed for heartburn for up to 30 days, Starting Mon 3/11/2019, Until Wed 4/10/2019, Normal      ZADITOR 0 025 % ophthalmic solution instill 1 drop into both eyes twice a day if needed for ITCHY EYES FOR UP TO 90 DAYS, Historical Med           No discharge procedures on file  PDMP Review       Value Time User    PDMP Reviewed  Yes 2/25/2020 10:44 AM Traci Zarco PA-C           ED Provider  Attending physically available and evaluated Ankur Pittman  I managed the patient along with the ED Attending      Electronically Signed by         Lorelei Aponte MD  04/25/21 2010

## 2021-04-26 NOTE — ED ATTENDING ATTESTATION
4/25/2021  IJohn MD, saw and evaluated the patient  I have discussed the patient with the resident/non-physician practitioner and agree with the resident's/non-physician practitioner's findings, Plan of Care, and MDM as documented in the resident's/non-physician practitioner's note, except where noted  All available labs and Radiology studies were reviewed  I was present for key portions of any procedure(s) performed by the resident/non-physician practitioner and I was immediately available to provide assistance  At this point I agree with the current assessment done in the Emergency Department  I have conducted an independent evaluation of this patient a history and physical is as follows: This is a 30 y o  old female who presents to the ED for evaluation of viral symptoms  2d cough chets tightness, HA, chills  Hx COVID expsoure at work and had COVID in January  Cough nonproductive, no CP, just tight and mild dyspnea  VS and nursing notes reviewed  General: Appears in NAD, awake, alert, speaking normally in full sentences  Well-nourished, well-developed  Appears stated age  Head: Normocephalic, atraumatic  Eyes: EOMI  Vision grossly normal  No subconjunctival hemorrhages or occular discharge noted  Symmetrical lids  ENT: Atraumatic external nose and ears  No stridor  Normal phonation  No drooling  Normal swallowing  Neck: No JVD  FROM  No goiter  CV: No pallor  Normal rate  Lungs: No tachypnea  No respiratory distress  MSK: Moving all extremities equally, no peripheral edema  Skin: Dry, intact  No cyanosis  Neuro: Awake, alert, GCS15  CN II-XII grossly intact  Grossly normal gait  Psychiatric/Behavioral: Appropriate mood and affect  A/P: This is a 30 y o  female who presents to the ED for evaluation of viral symptoms  Supportive care, COVID test  OP Follow up      ED Course         Critical Care Time  Procedures

## 2021-04-26 NOTE — TELEPHONE ENCOUNTER
Your test for the novel coronavirus, also known as COVID-19, was positive  The sample showed that the virus was present  Positive COVID-19 test results are reportable to the [place] Department of Health  You may receive a call from trained public health staff to conduct an interview  It is important to answer their call  They will ask you to verify who you are  During the call they will ask you about what symptoms you have, what you did before you got sick, and who you were close to while sick  The health department does this to make sure everyone stays healthy and to reduce the spread of the virus  If you would like to verify if the caller does in fact work in contact tracing, call the [address] [place] [address] at [place] ([phone])  For additional information, please visit the [employer] website: [email or web address] [email or web address]     If you have any additional questions, we can schedule a virtual visit for you with a provider or call the [facility] [phone], option 7, for care advice    For additional information, please visit the Coronavirus FAQ on the [facility] home page ([email or web address]  [email or web address])

## 2021-05-04 ENCOUNTER — TELEPHONE (OUTPATIENT)
Dept: OBGYN CLINIC | Facility: CLINIC | Age: 36
End: 2021-05-04

## 2021-05-10 ENCOUNTER — IMMUNIZATIONS (OUTPATIENT)
Dept: FAMILY MEDICINE CLINIC | Facility: HOSPITAL | Age: 36
End: 2021-05-10

## 2021-05-10 DIAGNOSIS — Z23 ENCOUNTER FOR IMMUNIZATION: Primary | ICD-10-CM

## 2021-05-10 PROCEDURE — 91300 SARS-COV-2 / COVID-19 MRNA VACCINE (PFIZER-BIONTECH) 30 MCG: CPT

## 2021-05-10 PROCEDURE — 0002A SARS-COV-2 / COVID-19 MRNA VACCINE (PFIZER-BIONTECH) 30 MCG: CPT

## 2021-05-30 ENCOUNTER — IMMUNIZATIONS (OUTPATIENT)
Dept: FAMILY MEDICINE CLINIC | Facility: HOSPITAL | Age: 36
End: 2021-05-30

## 2021-05-30 DIAGNOSIS — Z23 ENCOUNTER FOR IMMUNIZATION: Primary | ICD-10-CM

## 2021-05-30 PROCEDURE — 0002A SARS-COV-2 / COVID-19 MRNA VACCINE (PFIZER-BIONTECH) 30 MCG: CPT

## 2021-05-30 PROCEDURE — 91300 SARS-COV-2 / COVID-19 MRNA VACCINE (PFIZER-BIONTECH) 30 MCG: CPT

## 2021-06-17 ENCOUNTER — ANNUAL EXAM (OUTPATIENT)
Dept: OBGYN CLINIC | Facility: CLINIC | Age: 36
End: 2021-06-17
Payer: COMMERCIAL

## 2021-06-17 VITALS
WEIGHT: 155 LBS | BODY MASS INDEX: 25.83 KG/M2 | SYSTOLIC BLOOD PRESSURE: 120 MMHG | DIASTOLIC BLOOD PRESSURE: 82 MMHG | HEIGHT: 65 IN

## 2021-06-17 DIAGNOSIS — Z01.419 WOMEN'S ANNUAL ROUTINE GYNECOLOGICAL EXAMINATION: Primary | ICD-10-CM

## 2021-06-17 PROBLEM — Z98.890 STATUS POST GASTRIC SURGERY: Status: ACTIVE | Noted: 2020-07-09

## 2021-06-17 PROCEDURE — 0503F POSTPARTUM CARE VISIT: CPT | Performed by: NURSE PRACTITIONER

## 2021-06-17 PROCEDURE — 99395 PREV VISIT EST AGE 18-39: CPT | Performed by: NURSE PRACTITIONER

## 2021-06-17 RX ORDER — MONTELUKAST SODIUM 10 MG/1
TABLET ORAL
COMMUNITY
Start: 2021-01-21

## 2021-06-17 NOTE — PROGRESS NOTES
Subjective    HPI:     Naseem Samayoa is a 30 y o  female  She is a [place] 4 Para 3, with 3 prior C/S  Her menstrual cycles are regular and predictable  Her current method of contraception includes tubal ligation  She is still experiences pain with intercourse every so often, she feels is positional  She denies /GI and Gyn complaints  She feels safe at home  She has episodic  depression/anxiety which she manages  Medical, surgical and family history reviewed  Her dental care is up-to-date  She eats a healthy diet  She is happy with her weight  Gynecologic History    Patient's last menstrual period was 2021 (exact date)  Last Pap: 2020  Results were: normal      Obstetric History    OB History    Para Term  AB Living   4 3     1 3   SAB TAB Ectopic Multiple Live Births     1     3      # Outcome Date GA Lbr Luciano/2nd Weight Sex Delivery Anes PTL Lv   4 TAB            3 Para            2 Para            1 Para                The following portions of the patient's history were reviewed and updated as appropriate: allergies, current medications, past family history, past medical history, past social history, past surgical history and problem list     Review of Systems    Pertinent items are noted in HPI  Objective    Physical Exam  Constitutional:       Appearance: Normal appearance  She is well-developed  Genitourinary:      Pelvic exam was performed with patient in the lithotomy position  Vulva, inguinal canal, urethra, bladder, vagina, uterus, right adnexa and left adnexa normal       No posterior fourchette tenderness, injury, rash or lesion present  Cervix is not parous  No cervical motion tenderness, discharge, friability, lesion, erythema, bleeding, polyp or nabothian cyst       Uterus is anteverted  No right or left adnexal mass present  Right adnexa not tender or full  Left adnexa not tender or full     HENT:      Head: Normocephalic and atraumatic  Neck:      Thyroid: No thyromegaly  Cardiovascular:      Rate and Rhythm: Normal rate and regular rhythm  Heart sounds: Normal heart sounds, S1 normal and S2 normal    Pulmonary:      Effort: Pulmonary effort is normal       Breath sounds: Normal breath sounds  Chest:      Breasts: Breasts are symmetrical          Right: Normal  No inverted nipple, mass, nipple discharge, skin change or tenderness  Left: Normal  No inverted nipple, mass, nipple discharge, skin change or tenderness  Abdominal:      General: Bowel sounds are normal  There is no distension  Palpations: Abdomen is soft  There is no mass  Tenderness: There is no abdominal tenderness  There is no guarding  Musculoskeletal:      Cervical back: Neck supple  Lymphadenopathy:      Cervical: No cervical adenopathy  Upper Body:      Right upper body: No supraclavicular or axillary adenopathy  Left upper body: No supraclavicular or axillary adenopathy  Neurological:      Mental Status: She is alert  Skin:     General: Skin is warm and dry  Findings: No rash  Psychiatric:         Attention and Perception: Attention and perception normal          Mood and Affect: Mood and affect normal          Speech: Speech normal          Behavior: Behavior is cooperative  Thought Content: Thought content normal          Cognition and Memory: Cognition and memory normal          Judgment: Judgment normal    Vitals and nursing note reviewed  Assessment and Plan    Gómez was seen today for gynecologic exam     Diagnoses and all orders for this visit:    Women's annual routine gynecological examination      Patient informed of a Stable GYN exam  A pap smear was not performed negative pap in 2020  I have discussed the importance of exercise and healthy diet as well as adequate intake of calcium and vitamin D  The current ASCCP guidelines were reviewed   The low risk patient will receive pap smear screening every 3 years until the age of 34 and then every 3 to 5 years with HPV co-testing from the ages of 33-67  I emphasized the importance of an annual pelvic and breast exam  A yearly mammogram is recommended for breast cancer screening starting at age 36  [facility], if abnormal will call to review and discuss treatment plan  All questions have been answered to her satisfaction  Follow up in: 1 year

## 2021-09-07 ENCOUNTER — TRANSCRIBE ORDERS (OUTPATIENT)
Dept: LAB | Facility: HOSPITAL | Age: 36
End: 2021-09-07

## 2021-09-07 ENCOUNTER — APPOINTMENT (OUTPATIENT)
Dept: LAB | Facility: HOSPITAL | Age: 36
End: 2021-09-07
Payer: COMMERCIAL

## 2021-09-07 DIAGNOSIS — Z00.00 ROUTINE GENERAL MEDICAL EXAMINATION AT A HEALTH CARE FACILITY: ICD-10-CM

## 2021-09-07 DIAGNOSIS — Z98.890 PERSONAL HISTORY OF SURGERY TO HEART AND GREAT VESSELS, PRESENTING HAZARDS TO HEALTH: ICD-10-CM

## 2021-09-07 DIAGNOSIS — Z00.00 ROUTINE GENERAL MEDICAL EXAMINATION AT A HEALTH CARE FACILITY: Primary | ICD-10-CM

## 2021-09-07 LAB
25(OH)D3 SERPL-MCNC: 33.2 NG/ML (ref 30–100)
ALBUMIN SERPL BCP-MCNC: 3.4 G/DL (ref 3.5–5)
ALP SERPL-CCNC: 61 U/L (ref 46–116)
ALT SERPL W P-5'-P-CCNC: 19 U/L (ref 12–78)
ANION GAP SERPL CALCULATED.3IONS-SCNC: 4 MMOL/L (ref 4–13)
AST SERPL W P-5'-P-CCNC: 17 U/L (ref 5–45)
BASOPHILS # BLD AUTO: 0.06 THOUSANDS/ΜL (ref 0–0.1)
BASOPHILS NFR BLD AUTO: 2 % (ref 0–1)
BILIRUB SERPL-MCNC: 0.29 MG/DL (ref 0.2–1)
BUN SERPL-MCNC: 8 MG/DL (ref 5–25)
CALCIUM ALBUM COR SERPL-MCNC: 9 MG/DL (ref 8.3–10.1)
CALCIUM SERPL-MCNC: 8.5 MG/DL (ref 8.3–10.1)
CHLORIDE SERPL-SCNC: 108 MMOL/L (ref 100–108)
CHOLEST SERPL-MCNC: 168 MG/DL (ref 50–200)
CO2 SERPL-SCNC: 26 MMOL/L (ref 21–32)
CREAT SERPL-MCNC: 0.69 MG/DL (ref 0.6–1.3)
EOSINOPHIL # BLD AUTO: 0.03 THOUSAND/ΜL (ref 0–0.61)
EOSINOPHIL NFR BLD AUTO: 1 % (ref 0–6)
ERYTHROCYTE [DISTWIDTH] IN BLOOD BY AUTOMATED COUNT: 12.5 % (ref 11.6–15.1)
EST. AVERAGE GLUCOSE BLD GHB EST-MCNC: 94 MG/DL
FERRITIN SERPL-MCNC: 133 NG/ML (ref 8–388)
GFR SERPL CREATININE-BSD FRML MDRD: 113 ML/MIN/1.73SQ M
GLUCOSE P FAST SERPL-MCNC: 71 MG/DL (ref 65–99)
HBA1C MFR BLD: 4.9 %
HCT VFR BLD AUTO: 39.2 % (ref 34.8–46.1)
HCV AB SER QL: NORMAL
HDLC SERPL-MCNC: 68 MG/DL
HGB BLD-MCNC: 12.9 G/DL (ref 11.5–15.4)
IMM GRANULOCYTES # BLD AUTO: 0.01 THOUSAND/UL (ref 0–0.2)
IMM GRANULOCYTES NFR BLD AUTO: 0 % (ref 0–2)
IRON SERPL-MCNC: 57 UG/DL (ref 50–170)
LDLC SERPL CALC-MCNC: 79 MG/DL (ref 0–100)
LYMPHOCYTES # BLD AUTO: 1.48 THOUSANDS/ΜL (ref 0.6–4.47)
LYMPHOCYTES NFR BLD AUTO: 36 % (ref 14–44)
MCH RBC QN AUTO: 34.9 PG (ref 26.8–34.3)
MCHC RBC AUTO-ENTMCNC: 32.9 G/DL (ref 31.4–37.4)
MCV RBC AUTO: 106 FL (ref 82–98)
MONOCYTES # BLD AUTO: 0.32 THOUSAND/ΜL (ref 0.17–1.22)
MONOCYTES NFR BLD AUTO: 8 % (ref 4–12)
NEUTROPHILS # BLD AUTO: 2.19 THOUSANDS/ΜL (ref 1.85–7.62)
NEUTS SEG NFR BLD AUTO: 53 % (ref 43–75)
NONHDLC SERPL-MCNC: 100 MG/DL
NRBC BLD AUTO-RTO: 0 /100 WBCS
PLATELET # BLD AUTO: 332 THOUSANDS/UL (ref 149–390)
PMV BLD AUTO: 9 FL (ref 8.9–12.7)
POTASSIUM SERPL-SCNC: 4.2 MMOL/L (ref 3.5–5.3)
PROT SERPL-MCNC: 7.4 G/DL (ref 6.4–8.2)
RBC # BLD AUTO: 3.7 MILLION/UL (ref 3.81–5.12)
RETICS # AUTO: NORMAL 10*3/UL (ref 14097–95744)
RETICS # CALC: 1.21 % (ref 0.37–1.87)
SODIUM SERPL-SCNC: 138 MMOL/L (ref 136–145)
TIBC SERPL-MCNC: 265 UG/DL (ref 250–450)
TRIGL SERPL-MCNC: 106 MG/DL
TSH SERPL DL<=0.05 MIU/L-ACNC: 1.19 UIU/ML (ref 0.36–3.74)
VIT B12 SERPL-MCNC: 182 PG/ML (ref 100–900)
WBC # BLD AUTO: 4.09 THOUSAND/UL (ref 4.31–10.16)

## 2021-09-07 PROCEDURE — 82607 VITAMIN B-12: CPT

## 2021-09-07 PROCEDURE — 85045 AUTOMATED RETICULOCYTE COUNT: CPT

## 2021-09-07 PROCEDURE — 83036 HEMOGLOBIN GLYCOSYLATED A1C: CPT

## 2021-09-07 PROCEDURE — 80061 LIPID PANEL: CPT

## 2021-09-07 PROCEDURE — 83550 IRON BINDING TEST: CPT

## 2021-09-07 PROCEDURE — 83540 ASSAY OF IRON: CPT

## 2021-09-07 PROCEDURE — 86803 HEPATITIS C AB TEST: CPT

## 2021-09-07 PROCEDURE — 85025 COMPLETE CBC W/AUTO DIFF WBC: CPT

## 2021-09-07 PROCEDURE — 82306 VITAMIN D 25 HYDROXY: CPT

## 2021-09-07 PROCEDURE — 80053 COMPREHEN METABOLIC PANEL: CPT

## 2021-09-07 PROCEDURE — 82728 ASSAY OF FERRITIN: CPT

## 2021-09-07 PROCEDURE — 36415 COLL VENOUS BLD VENIPUNCTURE: CPT

## 2021-09-07 PROCEDURE — 84425 ASSAY OF VITAMIN B-1: CPT

## 2021-09-07 PROCEDURE — 84630 ASSAY OF ZINC: CPT

## 2021-09-07 PROCEDURE — 84443 ASSAY THYROID STIM HORMONE: CPT

## 2021-09-11 LAB — VIT B1 BLD-SCNC: 116.2 NMOL/L (ref 66.5–200)

## 2021-09-15 LAB — ZINC SERPL-MCNC: 97 UG/DL (ref 44–115)

## 2022-07-05 RX ORDER — DICLOFENAC SODIUM 75 MG/1
75 TABLET, DELAYED RELEASE ORAL
COMMUNITY
Start: 2022-05-18 | End: 2023-05-18

## 2022-07-05 RX ORDER — ALBUTEROL SULFATE 90 UG/1
2 AEROSOL, METERED RESPIRATORY (INHALATION) 4 TIMES DAILY PRN
COMMUNITY
Start: 2022-01-19 | End: 2023-01-19

## 2022-07-05 RX ORDER — ALPRAZOLAM 0.25 MG/1
0.25 TABLET ORAL
COMMUNITY
Start: 2022-02-16 | End: 2022-08-15

## 2022-07-05 RX ORDER — FAMOTIDINE 40 MG/1
40 TABLET, FILM COATED ORAL DAILY
COMMUNITY
Start: 2022-01-19 | End: 2023-01-19

## 2022-07-06 ENCOUNTER — ANNUAL EXAM (OUTPATIENT)
Dept: OBGYN CLINIC | Facility: CLINIC | Age: 37
End: 2022-07-06
Payer: COMMERCIAL

## 2022-07-06 VITALS
DIASTOLIC BLOOD PRESSURE: 80 MMHG | SYSTOLIC BLOOD PRESSURE: 118 MMHG | WEIGHT: 168 LBS | BODY MASS INDEX: 27.99 KG/M2 | HEIGHT: 65 IN

## 2022-07-06 DIAGNOSIS — B37.31 VAGINAL CANDIDA: ICD-10-CM

## 2022-07-06 DIAGNOSIS — B96.89 BV (BACTERIAL VAGINOSIS): ICD-10-CM

## 2022-07-06 DIAGNOSIS — Z01.419 WOMEN'S ANNUAL ROUTINE GYNECOLOGICAL EXAMINATION: Primary | ICD-10-CM

## 2022-07-06 DIAGNOSIS — N76.0 BV (BACTERIAL VAGINOSIS): ICD-10-CM

## 2022-07-06 PROCEDURE — 99395 PREV VISIT EST AGE 18-39: CPT | Performed by: NURSE PRACTITIONER

## 2022-07-06 PROCEDURE — 0503F POSTPARTUM CARE VISIT: CPT | Performed by: NURSE PRACTITIONER

## 2022-07-06 RX ORDER — FLUCONAZOLE 150 MG/1
150 TABLET ORAL ONCE
Qty: 1 TABLET | Refills: 0 | Status: SHIPPED | OUTPATIENT
Start: 2022-07-06 | End: 2022-07-06

## 2022-07-06 RX ORDER — CEPHALEXIN 500 MG/1
CAPSULE ORAL
COMMUNITY
Start: 2022-05-20

## 2022-07-06 RX ORDER — METRONIDAZOLE 500 MG/1
500 TABLET ORAL EVERY 12 HOURS SCHEDULED
Qty: 14 TABLET | Refills: 0 | Status: SHIPPED | OUTPATIENT
Start: 2022-07-06 | End: 2022-07-13

## 2022-07-06 RX ORDER — GABAPENTIN 300 MG/1
300 CAPSULE ORAL
COMMUNITY
Start: 2022-07-05

## 2022-07-06 RX ORDER — SERTRALINE HYDROCHLORIDE 25 MG/1
25 TABLET, FILM COATED ORAL DAILY
COMMUNITY
Start: 2022-05-25

## 2022-07-06 RX ORDER — METHOCARBAMOL 500 MG/1
TABLET, FILM COATED ORAL
COMMUNITY
Start: 2022-05-20

## 2022-07-06 NOTE — PROGRESS NOTES
Subjective    HPI:     Ricardo Means is a 29 y o  female  She is a [place] 4 Para 3, with C/S x 3  Her menstrual cycles are regular and predictable  Her current method of contraception includes tubal ligation  She is not currently intimate, but when she is she experience mild discomfort  She denies /GI and Gyn complaints  She feels safe at home  She states her  depression/anxiety is somewhat stable  She follows 2 to 3 times a month with mental health provider  Medical, surgical and family history reviewed  Her dental care is up-to-date  She eats a healthy diet and exercises regularly  She is not happy with her weight  Gynecologic History    Patient's last menstrual period was 2022  Last Pap: 2020  Results were: normal    Obstetric History    OB History    Para Term  AB Living   4 3 3   1 3   SAB IAB Ectopic Multiple Live Births     1     3      # Outcome Date GA Lbr Luciano/2nd Weight Sex Delivery Anes PTL Lv   4 IAB            3 Term      CS-Unspec   WILBUR   2 Term      CS-Unspec   WILBUR   1 Term      CS-Unspec   WILBUR       The following portions of the patient's history were reviewed and updated as appropriate: allergies, current medications, past family history, past medical history, past social history, past surgical history and problem list     Review of Systems    Pertinent items are noted in HPI  Objective    Physical Exam  Constitutional:       Appearance: Normal appearance  She is well-developed  Genitourinary:      Vulva, bladder and urethral meatus normal       No lesions in the vagina  Right Labia: No rash, tenderness, lesions, skin changes or Bartholin's cyst      Left Labia: No tenderness, lesions, skin changes, Bartholin's cyst or rash  No labial fusion noted  No inguinal adenopathy present in the right or left side  Vaginal discharge (Mixed of milkly curd-appearing dischage with odor consistent with BV and candida) present        No vaginal erythema, tenderness, bleeding or granulation tissue  No vaginal prolapse present  No vaginal atrophy present  Right Adnexa: not tender, not full and no mass present  Left Adnexa: not tender, not full and no mass present  Cervix is not parous  No cervical motion tenderness, discharge, friability, lesion, polyp or nabothian cyst       Uterus is not enlarged, tender, irregular or prolapsed  No uterine mass detected  Uterus is anteverted  Pelvic exam was performed with patient in the lithotomy position  Breasts: Breasts are symmetrical       Right: No inverted nipple, mass, nipple discharge, skin change, tenderness, axillary adenopathy or supraclavicular adenopathy  Left: No inverted nipple, mass, nipple discharge, skin change, tenderness, axillary adenopathy or supraclavicular adenopathy  HENT:      Head: Normocephalic and atraumatic  Neck:      Thyroid: No thyromegaly  Cardiovascular:      Rate and Rhythm: Normal rate and regular rhythm  Heart sounds: Normal heart sounds, S1 normal and S2 normal    Pulmonary:      Effort: Pulmonary effort is normal       Breath sounds: Normal breath sounds  Abdominal:      General: Bowel sounds are normal  There is no distension  Palpations: Abdomen is soft  There is no mass  Tenderness: There is no abdominal tenderness  There is no guarding  Hernia: There is no hernia in the left inguinal area or right inguinal area  Musculoskeletal:      Cervical back: Neck supple  Lymphadenopathy:      Cervical: No cervical adenopathy  Upper Body:      Right upper body: No supraclavicular or axillary adenopathy  Left upper body: No supraclavicular or axillary adenopathy  Lower Body: No right inguinal adenopathy  No left inguinal adenopathy  Neurological:      Mental Status: She is alert  Skin:     General: Skin is warm and dry  Findings: No rash     Psychiatric:         Attention and Perception: Attention and perception normal          Mood and Affect: Mood and affect normal          Speech: Speech normal          Behavior: Behavior is cooperative  Thought Content: Thought content normal          Cognition and Memory: Cognition and memory normal          Judgment: Judgment normal    Vitals and nursing note reviewed  Assessment and Plan    Gómez was seen today for gynecologic exam and pelvic pain  Diagnoses and all orders for this visit:    Women's annual routine gynecological examination    BV (bacterial vaginosis)  -     metroNIDAZOLE (FLAGYL) 500 mg tablet; Take 1 tablet (500 mg total) by mouth every 12 (twelve) hours for 7 days    Vaginal candida  -     fluconazole (DIFLUCAN) 150 mg tablet; Take 1 tablet (150 mg total) by mouth once for 1 dose      Patient informed of a Stable GYN exam with the exception of BV and candida noted on exam  Treatment initiated for both  A pap smear was not performed due to a normal pap in 2020  I have discussed the importance of exercise and healthy diet as well as adequate intake of calcium and vitamin D  The current ASCCP guidelines were reviewed  The low risk patient will receive pap smear screening every 3 years until the age of 34 and then every 3 to 5 years with HPV co-testing from the ages of 33-67  I emphasized the importance of an annual pelvic and breast exam  A yearly mammogram is recommended for breast cancer screening starting at age 36  Results will be released to [facility], if abnormal will call to review and discuss treatment plan  All questions have been answered to her satisfaction  Follow up in: 1 year or sooner

## 2022-07-18 ENCOUNTER — HOSPITAL ENCOUNTER (EMERGENCY)
Facility: HOSPITAL | Age: 37
Discharge: HOME | End: 2022-07-18
Attending: EMERGENCY MEDICINE | Admitting: EMERGENCY MEDICINE
Payer: COMMERCIAL

## 2022-07-18 VITALS
DIASTOLIC BLOOD PRESSURE: 78 MMHG | RESPIRATION RATE: 18 BRPM | OXYGEN SATURATION: 98 % | SYSTOLIC BLOOD PRESSURE: 115 MMHG | TEMPERATURE: 97.8 F | HEART RATE: 80 BPM

## 2022-07-18 DIAGNOSIS — B34.9 VIRAL SYNDROME: Primary | ICD-10-CM

## 2022-07-18 DIAGNOSIS — R19.7 DIARRHEA: ICD-10-CM

## 2022-07-18 PROCEDURE — 99283 EMERGENCY DEPT VISIT LOW MDM: CPT

## 2022-07-18 PROCEDURE — 87636 SARSCOV2 & INF A&B AMP PRB: CPT | Performed by: INTERNAL MEDICINE

## 2022-07-18 PROCEDURE — 99284 EMERGENCY DEPT VISIT MOD MDM: CPT | Performed by: EMERGENCY MEDICINE

## 2022-07-18 RX ORDER — NAPROXEN 500 MG/1
500 TABLET ORAL 2 TIMES DAILY WITH MEALS
Qty: 20 TABLET | Refills: 0 | Status: SHIPPED | OUTPATIENT
Start: 2022-07-18

## 2022-07-18 RX ORDER — FLUTICASONE PROPIONATE 50 MCG
1 SPRAY, SUSPENSION (ML) NASAL DAILY
Qty: 16 G | Refills: 0 | Status: SHIPPED | OUTPATIENT
Start: 2022-07-18

## 2022-07-18 RX ORDER — LOPERAMIDE HYDROCHLORIDE 2 MG/1
2 CAPSULE ORAL 4 TIMES DAILY PRN
Qty: 12 CAPSULE | Refills: 0 | Status: SHIPPED | OUTPATIENT
Start: 2022-07-18

## 2022-07-18 NOTE — Clinical Note
Harry Bae was seen and treated in our emergency department on 7/18/2022  Diagnosis:     Gómez    She may return on this date: If you have any questions or concerns, please don't hesitate to call        Henri Child MD    ______________________________           _______________          _______________  [facility] Representative                              Date                                Time

## 2022-07-18 NOTE — ED PROVIDER NOTES
HPI: Patient is a 29 y o  female who presents with 1 days of cough, sore throat, fatigue and diarrhea which the patient describes at mild The patient has had contact with people with similar symptoms  The patient has not taken any medication  Allergies   Allergen Reactions    Dust Mite Extract     Pollen Extract     Short Ragweed Pollen Ext        Past Medical History:   Diagnosis Date    Asthma     GERD (gastroesophageal reflux disease)     last assessed: 2014    Migraine     Pregnancy induced hypertension       Past Surgical History:   Procedure Laterality Date     SECTION      x 3    INDUCED       TUBAL LIGATION       Social History     Tobacco Use    Smoking status: Never Smoker    Smokeless tobacco: Never Used   Vaping Use    Vaping Use: Never used   Substance Use Topics    Alcohol use: Not Currently     Comment: social    Drug use: Never       Nursing notes reviewed  Physical Exam:  ED Triage Vitals   Temperature Pulse Respirations Blood Pressure SpO2   22 1358 22 1359 22 1359 22 1359 22 1359   97 8 °F (36 6 °C) 80 18 115/78 98 %      Temp src Heart Rate Source Patient Position - Orthostatic VS BP Location FiO2 (%)   -- -- -- -- --             Pain Score       22 1359       8           ROS: Positive for cough, sore throat, fatigue and diarrhea, the remainder of a 10 organ system ROS was otherwise unremarkable    General: awake, alert, no acute distress  Head: normocephalic, atraumatic  Eyes: no scleral icterus  Ears: external ears normal, hearing grossly intact  Nose: external exam grossly normal, positive nasal discharge  Neck: symmetric, No JVD noted, trachea midline  Pulmonary: no respiratory distress, no tachypnea noted  Cardiovascular: appears well perfused  Abdomen: no distention noted  Musculoskeletal: no deformities noted, tone normal  Neuro: grossly non-focal  Psych: mood and affect appropriate    The patient is stable and has a history and physical exam consistent with a viral illness  COVID19 testing has been performed  I considered the patient's other medical conditions as applicable/noted above in my medical decision making  The patient is stable upon discharge  The plan is for supportive care at home  The patient (and any family present) verbalized understanding of the discharge instructions and warnings that would necessitate return to the Emergency Department  All questions were answered prior to discharge  Medications - No data to display  Final diagnoses:   Viral syndrome   Diarrhea     Time reflects when diagnosis was documented in both MDM as applicable and the Disposition within this note     Time User Action Codes Description Comment    7/18/2022  3:16 PM Janet Nicholas [B34 9] Viral syndrome     7/18/2022  3:16 PM Janet Nicholas [R19 [id number]] Diarrhea       ED Disposition     ED Disposition   Discharge    Condition   Stable    Date/Time   Mon Jul 18, 2022  3:15 PM    Comment   Ankur Pittman discharge to home/self care  Follow-up Information    None       Patient's Medications   Discharge Prescriptions    FLUTICASONE (FLONASE) 50 MCG/ACT NASAL SPRAY    1 spray into each nostril daily       Start Date: 7/18/2022 End Date: --       Order Dose: 1 spray       Quantity: 16 g    Refills: 0    LOPERAMIDE (IMODIUM) 2 MG CAPSULE    Take 1 capsule (2 mg total) by mouth 4 (four) times a day as needed for diarrhea       Start Date: 7/18/2022 End Date: --       Order Dose: 2 mg       Quantity: 12 capsule    Refills: 0    NAPROXEN (NAPROSYN) 500 MG TABLET    Take 1 tablet (500 mg total) by mouth 2 (two) times a day with meals       Start Date: 7/18/2022 End Date: --       Order Dose: 500 mg       Quantity: 20 tablet    Refills: 0     No discharge procedures on file      Electronically Signed by       Abigail Dimas MD  07/18/22 [id number]

## 2022-07-18 NOTE — ED ATTENDING ATTESTATION
7/18/2022  I, Rosana Grady MD, saw and evaluated the patient  I have discussed the patient with the resident/non-physician practitioner and agree with the resident's/non-physician practitioner's findings, Plan of Care, and MDM as documented in the resident's/non-physician practitioner's note, except where noted  All available labs and Radiology studies were reviewed  I was present for key portions of any procedure(s) performed by the resident/non-physician practitioner and I was immediately available to provide assistance  At this point I agree with the current assessment done in the Emergency Department  I have conducted an independent evaluation of this patient a history and physical is as follows:    ED Course         Critical Care Time  Procedures    28 yo female with no pmh, having few days of congestion, cough, nonproductive, no fever  Pt with abdominal pain yesterday, diarrhea  One episode of vomiting  No urinary complaints  Pt with cp with deep breaths and cough  Pt feel ear pressure bilaterally  Pt taking cough drops which helped  Pt with sick contacts at work, negative for covid  Vss, afebrile, lungs cta, nasal congestion, rrr, abdomen soft mild periumbilical tenderness, no rebound, no guarding  covid swab, viral illness

## 2022-07-19 LAB
FLUAV RNA RESP QL NAA+PROBE: NEGATIVE
FLUBV RNA RESP QL NAA+PROBE: NEGATIVE
SARS-COV-2 RNA RESP QL NAA+PROBE: NEGATIVE

## 2022-07-28 ENCOUNTER — HOSPITAL ENCOUNTER (EMERGENCY)
Facility: HOSPITAL | Age: 37
Discharge: HOME | End: 2022-07-28
Attending: EMERGENCY MEDICINE | Admitting: EMERGENCY MEDICINE
Payer: COMMERCIAL

## 2022-07-28 VITALS
SYSTOLIC BLOOD PRESSURE: 136 MMHG | OXYGEN SATURATION: 98 % | DIASTOLIC BLOOD PRESSURE: 62 MMHG | HEART RATE: 82 BPM | RESPIRATION RATE: 18 BRPM | TEMPERATURE: 97.9 F

## 2022-07-28 DIAGNOSIS — J06.9 VIRAL URI WITH COUGH: ICD-10-CM

## 2022-07-28 DIAGNOSIS — N39.0 UTI (URINARY TRACT INFECTION): Primary | ICD-10-CM

## 2022-07-28 DIAGNOSIS — J02.9 PHARYNGITIS: ICD-10-CM

## 2022-07-28 LAB
AMORPH URATE CRY URNS QL MICRO: ABNORMAL
BACTERIA UR QL AUTO: ABNORMAL /HPF
BILIRUB UR QL STRIP: NEGATIVE
CLARITY UR: CLEAR
COLOR UR: YELLOW
COLOR, POC: YELLOW
EXT PREG TEST URINE: NEGATIVE
EXT. CONTROL ED NAV: NORMAL
FLUAV RNA RESP QL NAA+PROBE: NEGATIVE
FLUBV RNA RESP QL NAA+PROBE: NEGATIVE
GLUCOSE UR STRIP-MCNC: NEGATIVE MG/DL
HGB UR QL STRIP.AUTO: ABNORMAL
HYALINE CASTS #/AREA URNS LPF: ABNORMAL /LPF
KETONES UR STRIP-MCNC: ABNORMAL MG/DL
LEUKOCYTE ESTERASE UR QL STRIP: ABNORMAL
MUCOUS THREADS UR QL AUTO: ABNORMAL
NITRITE UR QL STRIP: POSITIVE
NON-SQ EPI CELLS URNS QL MICRO: ABNORMAL /HPF
PH UR STRIP.AUTO: 6 [PH] (ref 4.5–8)
PROT UR STRIP-MCNC: ABNORMAL MG/DL
RBC #/AREA URNS AUTO: ABNORMAL /HPF
RSV RNA RESP QL NAA+PROBE: NEGATIVE
SARS-COV-2 RNA RESP QL NAA+PROBE: NEGATIVE
SP GR UR STRIP.AUTO: 1.01 (ref 1–1.03)
UROBILINOGEN UR QL STRIP.AUTO: 1 E.U./DL
WBC #/AREA URNS AUTO: ABNORMAL /HPF

## 2022-07-28 PROCEDURE — 99284 EMERGENCY DEPT VISIT MOD MDM: CPT | Performed by: EMERGENCY MEDICINE

## 2022-07-28 PROCEDURE — 0241U HB NFCT DS VIR RESP RNA 4 TRGT: CPT | Performed by: EMERGENCY MEDICINE

## 2022-07-28 PROCEDURE — 81001 URINALYSIS AUTO W/SCOPE: CPT

## 2022-07-28 PROCEDURE — 93005 ELECTROCARDIOGRAM TRACING: CPT

## 2022-07-28 PROCEDURE — 96372 THER/PROPH/DIAG INJ SC/IM: CPT

## 2022-07-28 PROCEDURE — 81025 URINE PREGNANCY TEST: CPT | Performed by: EMERGENCY MEDICINE

## 2022-07-28 PROCEDURE — 99284 EMERGENCY DEPT VISIT MOD MDM: CPT

## 2022-07-28 RX ORDER — KETOROLAC TROMETHAMINE 30 MG/ML
15 INJECTION, SOLUTION INTRAMUSCULAR; INTRAVENOUS ONCE
Status: COMPLETED | OUTPATIENT
Start: 2022-07-28 | End: 2022-07-28

## 2022-07-28 RX ORDER — CEPHALEXIN 500 MG/1
500 CAPSULE ORAL ONCE
Status: COMPLETED | OUTPATIENT
Start: 2022-07-28 | End: 2022-07-28

## 2022-07-28 RX ORDER — ONDANSETRON 4 MG/1
4 TABLET, ORALLY DISINTEGRATING ORAL ONCE
Status: COMPLETED | OUTPATIENT
Start: 2022-07-28 | End: 2022-07-28

## 2022-07-28 RX ORDER — CEPHALEXIN 500 MG/1
500 CAPSULE ORAL EVERY 12 HOURS SCHEDULED
Qty: 14 CAPSULE | Refills: 0 | Status: SHIPPED | OUTPATIENT
Start: 2022-07-28 | End: 2022-08-04

## 2022-07-28 RX ADMIN — ONDANSETRON 4 MG: 4 TABLET, ORALLY DISINTEGRATING ORAL at 10:38

## 2022-07-28 RX ADMIN — DEXAMETHASONE SODIUM PHOSPHATE 10 MG: 10 INJECTION, SOLUTION INTRAMUSCULAR; INTRAVENOUS at 10:38

## 2022-07-28 RX ADMIN — CEPHALEXIN 500 MG: 500 CAPSULE ORAL at 11:47

## 2022-07-28 RX ADMIN — KETOROLAC TROMETHAMINE 15 MG: 30 INJECTION, SOLUTION INTRAMUSCULAR; INTRAVENOUS at 10:52

## 2022-07-28 NOTE — Clinical Note
Leatha Wagner was seen and treated in our emergency department on 7/28/2022  No restrictions        none    Diagnosis: UTI,  Viral syndrome    Gómez  may return to work on return date  She may return on this date: 08/03/2022         If you have any questions or concerns, please don't hesitate to call        Brian Samano MD    ______________________________           _______________          _______________  Hospital Representative                              Date                                Time

## 2022-07-29 LAB
ATRIAL RATE: 76 BPM
P AXIS: 58 DEGREES
PR INTERVAL: 144 MS
QRS AXIS: 48 DEGREES
QRSD INTERVAL: 82 MS
QT INTERVAL: 372 MS
QTC INTERVAL: 418 MS
T WAVE AXIS: 19 DEGREES
VENTRICULAR RATE: 76 BPM

## 2022-07-29 PROCEDURE — 93010 ELECTROCARDIOGRAM REPORT: CPT | Performed by: INTERNAL MEDICINE

## 2022-07-30 NOTE — ED PROVIDER NOTES
History  Chief Complaint   Patient presents with    URI     Pt reports vomiting yesterday tonsil swelling and generalized weakness since yest        Fatigue  Severity:  Moderate  Onset quality:  Gradual  Duration:  11 days  Timing:  Constant  Progression:  Worsening  Chronicity:  New  Context: not alcohol use, not allergies, not change in medication, not decreased sleep, not dehydration, not drug use, not increased activity, not pinched nerve, not recent infection, not stress and not urinary tract infection    Context comment:  Recent illness  Sick contacts at work  Relieved by:  Nothing  Worsened by:  Nothing  Ineffective treatments:  None tried  Associated symptoms: cough, dysuria, fever, frequency, headaches, myalgias, nausea and vomiting    Associated symptoms: no abdominal pain, no anorexia, no aphasia, no arthralgias, no ataxia, no chest pain, no diarrhea, no difficulty walking, no dizziness, no drooling, no numbness in extremities, no falls, no foul-smelling urine, no hematochezia, no lethargy, no loss of consciousness, no melena, no near-syncope, no seizures, no sensory-motor deficit, no shortness of breath, no stroke symptoms, no syncope, no urgency and no vision change    Risk factors: no anemia, no congestive heart failure, no coronary artery disease, no diabetes, no excessive menstruation, no family hx of stroke, no heart disease, no neurologic disease, no new medications and no recent stressors        Prior to Admission Medications   Prescriptions Last Dose Informant Patient Reported? Taking?    ALPRAZolam (XANAX) 0 25 mg tablet   Yes No   Sig: Take 0 25 mg by mouth   ZADITOR 0 025 % ophthalmic solution   Yes No   Sig: instill 1 drop into both eyes twice a day if needed for ITCHY EYES FOR UP TO 90 DAYS   albuterol (PROVENTIL HFA,VENTOLIN HFA) 90 mcg/act inhaler   Yes No   Sig: Inhale 2 puffs 4 (four) times a day as needed   cephalexin (KEFLEX) 500 mg capsule   Yes No   Sig: TAKE 1 CAPSULE BY MOUTH FOUR TIMES A DAY FOR 7 DAYS   diclofenac (VOLTAREN) 75 mg EC tablet   Yes No   Sig: Take 75 mg by mouth   famotidine (PEPCID) 40 MG tablet   Yes No   Sig: Take 40 mg by mouth daily   fluticasone (FLONASE) 50 mcg/act nasal spray   No No   Si spray into each nostril daily   gabapentin (NEURONTIN) 300 mg capsule   Yes No   Sig: Take 300 mg by mouth   loperamide (IMODIUM) 2 mg capsule   No No   Sig: Take 1 capsule (2 mg total) by mouth 4 (four) times a day as needed for diarrhea   methocarbamol (ROBAXIN) 500 mg tablet   Yes No   Sig: TAKE 1 TABLET BY MOUTH 4 TIMES A DAY AS NEEDED FOR MUSCLE SPASMS     montelukast (SINGULAIR) 10 mg tablet   Yes No   Sig: TAKE 1 TABLET BY MOUTH EVERY DAY AT NIGHT   naproxen (NAPROSYN) 500 mg tablet   No No   Sig: Take 1 tablet (500 mg total) by mouth 2 (two) times a day with meals   ranitidine (ZANTAC) 150 mg tablet   No No   Sig: Take 0 5 tablets (75 mg total) by mouth 2 (two) times a day as needed for heartburn for up to 30 days   sertraline (ZOLOFT) 25 mg tablet   Yes No   Sig: Take 25 mg by mouth daily      Facility-Administered Medications: None       Past Medical History:   Diagnosis Date    Asthma     GERD (gastroesophageal reflux disease)     last assessed: 2014    Migraine     Pregnancy induced hypertension        Past Surgical History:   Procedure Laterality Date     SECTION      x 3    INDUCED       TUBAL LIGATION         Family History   Problem Relation Age of Onset    Asthma Mother     Migraines Mother     Osteoarthritis Mother     Cervical cancer Mother     Hypertension Mother     Multiple sclerosis Mother 61    Arthritis Father     Diabetes Father     No Known Problems Sister     No Known Problems Brother     Asthma Daughter     Asthma Son     Diabetes Maternal Grandmother     Heart failure Maternal Grandmother     Arthritis Maternal Grandmother      I have reviewed and agree with the history as documented  E-Cigarette/Vaping    E-Cigarette Use Never User      E-Cigarette/Vaping Substances    Nicotine No     THC No     CBD No     Flavoring No     Other No     Unknown No      Social History     Tobacco Use    Smoking status: Never Smoker    Smokeless tobacco: Never Used   Vaping Use    Vaping Use: Never used   Substance Use Topics    Alcohol use: Not Currently     Comment: social    Drug use: Never       Review of Systems   Constitutional: Positive for fatigue and fever  Negative for activity change and chills  HENT: Negative for drooling, sore throat, trouble swallowing and voice change  Eyes: Negative for pain and visual disturbance  Respiratory: Positive for cough  Negative for choking, shortness of breath and wheezing  Cardiovascular: Negative for chest pain, leg swelling, syncope and near-syncope  Gastrointestinal: Positive for nausea and vomiting  Negative for abdominal distention, abdominal pain, anorexia, diarrhea, hematochezia and melena  Endocrine: Negative for polydipsia and polyuria  Genitourinary: Positive for dysuria and frequency  Negative for difficulty urinating, flank pain and urgency  Musculoskeletal: Positive for myalgias  Negative for arthralgias, falls and neck stiffness  Skin: Negative for color change and rash  Neurological: Positive for headaches  Negative for dizziness, seizures, loss of consciousness and speech difficulty  Hematological: Does not bruise/bleed easily  Psychiatric/Behavioral: Negative for agitation, behavioral problems, hallucinations and suicidal ideas  Physical Exam  Physical Exam  HENT:      Head: Normocephalic and atraumatic  Eyes:      Conjunctiva/sclera: Conjunctivae normal       Pupils: Pupils are equal, round, and reactive to light  Cardiovascular:      Rate and Rhythm: Normal rate and regular rhythm  Heart sounds: No murmur heard    Pulmonary:      Effort: Pulmonary effort is normal  No respiratory distress  Breath sounds: Normal breath sounds  Abdominal:      General: Bowel sounds are normal  There is no distension  Palpations: Abdomen is soft  Tenderness: There is no abdominal tenderness  Comments: No Signs of Peritonitis    Musculoskeletal:         General: Normal range of motion  Cervical back: Normal range of motion and neck supple  Skin:     General: Skin is warm and dry  Capillary Refill: Capillary refill takes less than 2 seconds  Coloration: Skin is not pale  Findings: No rash  Neurological:      Mental Status: She is alert and oriented to person, place, and time  GCS: GCS eye subscore is 4  GCS verbal subscore is 5  GCS motor subscore is 6        Comments: Normal speech, Normal gait, No Focal neurologic deficits    Psychiatric:         Behavior: Behavior normal          Vital Signs  ED Triage Vitals [07/28/22 1009]   Temperature Pulse Respirations Blood Pressure SpO2   97 9 °F (36 6 °C) 82 18 136/62 98 %      Temp Source Heart Rate Source Patient Position - Orthostatic VS BP Location FiO2 (%)   Oral Monitor Lying Right arm --      Pain Score       8           Vitals:    07/28/22 1009   BP: 136/62   Pulse: 82   Patient Position - Orthostatic VS: Lying         Visual Acuity      ED Medications  Medications   ketorolac (TORADOL) injection 15 mg (15 mg Intramuscular Given 7/28/22 1052)   dexamethasone oral liquid 10 mg 1 mL (10 mg Oral Given 7/28/22 1038)   ondansetron (ZOFRAN-ODT) dispersible tablet 4 mg (4 mg Oral Given 7/28/22 1038)   cephalexin (KEFLEX) capsule 500 mg (500 mg Oral Given 7/28/22 1147)       Diagnostic Studies  Results Reviewed     Procedure Component Value Units Date/Time    Urine Microscopic [[id number]]  (Abnormal) Collected: 07/28/22 1046    Lab Status: Final result Specimen: Urine, Clean Catch Updated: 07/28/22 1146     RBC, UA 1-2 /hpf      WBC, UA 4-10 /hpf      Epithelial Cells Occasional /hpf      Bacteria, UA Innumerable /hpf MUCUS THREADS Innumerable     Hyaline Casts, UA 0-3 /lpf      Amorphous Crystals, UA Occasional    FLU/RSV/COVID - if FLU/RSV clinically relevant [[id number]]  (Normal) Collected: 07/28/22 1039    Lab Status: Final result Specimen: Nares from Nose Updated: 07/28/22 1132     SARS-CoV-2 Negative     INFLUENZA A PCR Negative     INFLUENZA B PCR Negative     RSV PCR Negative    Narrative:      FOR PEDIATRIC PATIENTS - copy/paste COVID Guidelines URL to browser: [email or web address]  [employer]    SARS-CoV-2 assay is a Nucleic Acid Amplification assay intended for the  qualitative detection of nucleic acid from SARS-CoV-2 in nasopharyngeal  swabs  Results are for the presumptive identification of SARS-CoV-2 RNA  Positive results are indicative of infection with SARS-CoV-2, the virus  causing COVID-19, but do not rule out bacterial infection or co-infection  with other viruses  Laboratories within the [place] and its  territories are required to report all positive results to the appropriate  public health authorities  Negative results do not preclude SARS-CoV-2  infection and should not be used as the sole basis for treatment or other  patient management decisions  Negative results must be combined with  clinical observations, patient history, and epidemiological information  This test has not been FDA cleared or approved  This test has been authorized by FDA under an Emergency Use Authorization  (EUA)  This test is only authorized for the duration of time the  declaration that circumstances exist justifying the authorization of the  emergency use of an in vitro diagnostic tests for detection of SARS-CoV-2  virus and/or diagnosis of COVID-19 infection under section 564(b)(1) of  the Act, 21 U  S C  147EWN-3(Y)(6), unless the authorization is terminated  or revoked sooner  The test has been validated but independent review by FDA  and CLIA is pending      Test performed using [employer] Gene[employer]pert: This RT-PCR assay targets N2,  a region unique to SARS-CoV-2  A conserved region in the E-gene was chosen  for pan-Sarbecovirus detection which includes SARS-CoV-2  POCT pregnancy, urine [[id number]]  (Normal) Resulted: 07/28/22 1048    Lab Status: Final result Updated: 07/28/22 1048     EXT PREG TEST UR (Ref: Negative) negative     Control valid    POCT urinalysis dipstick [[id number]]  (Normal) Resulted: 07/28/22 1048    Lab Status: Final result Specimen: Urine Updated: 07/28/22 1048     Color, UA yellow    Urine Macroscopic, POC [[id number]]  (Abnormal) Collected: 07/28/22 1046    Lab Status: Final result Specimen: Urine Updated: 07/28/22 1047     Color, UA Yellow     Clarity, UA Clear     pH, UA 6 0     Leukocytes, UA Trace     Nitrite, UA Positive     Protein, UA Trace mg/dl      Glucose, UA Negative mg/dl      Ketones, UA 15 (1+) mg/dl      Urobilinogen, UA 1 0 E U /dl      Bilirubin, UA Negative     Occult Blood, UA Large     Specific [place], UA 1 015    Narrative:      CLINITEK RESULT                 No orders to display              Procedures  Procedures         ED Course        patient reassessed symptoms improved with treatment in ED will discharge home  Follow-up PCP as outpatient                        SBIRT 20yo+    Flowsheet Row Most Recent Value   SBIRT (25 yo +)    In order to provide better care to our patients, we are screening all of our patients for alcohol and drug use  Would it be okay to ask you these screening questions?  No Filed at: 07/28/2022 1016                    MDM  Number of Diagnoses or Management Options  Pharyngitis: new and does not require workup  UTI (urinary tract infection): new and requires workup  Viral URI with cough: new and requires workup     Amount and/or Complexity of Data Reviewed  Clinical lab tests: ordered and reviewed  Tests in the medicine section of CPT®: reviewed and ordered  Independent visualization of images, tracings, or specimens: yes    Risk of Complications, Morbidity, and/or Mortality  Presenting problems: moderate  Diagnostic procedures: moderate  Management options: moderate    Patient Progress  Patient progress: stable      Disposition  Final diagnoses:   UTI (urinary tract infection)   Pharyngitis   Viral URI with cough     Time reflects when diagnosis was documented in both MDM as applicable and the Disposition within this note     Time User Action Codes Description Comment    7/28/2022 11:29 AM Georgetta Mixer Add [N39 0] UTI (urinary tract infection)     7/28/2022 11:29 AM Georgetta Mixer Add [J02 9] Pharyngitis     7/28/2022 11:29 AM Georgetta Mixer Add [J06 9] Viral URI with cough       ED Disposition     ED Disposition   Discharge    Condition   Stable    Date/Time   Thu Jul 28, 2022 1500 [facility] discharge to home/self care  Follow-up Information     Follow up With Specialties Details Why Contact Info Additional [address] Emergency Department Emergency Medicine  If symptoms worsen [address] [phone]  [address] Emergency Department, [address] [place], [place], [address]          Discharge Medication List as of 7/28/2022 11:44 AM      START taking these medications    Details   !! cephalexin (KEFLEX) 500 mg capsule Take 1 capsule (500 mg total) by mouth every 12 (twelve) hours for 7 days, Starting Thu 7/28/2022, Until Thu 8/4/2022, Normal       !! - Potential duplicate medications found  Please discuss with provider        CONTINUE these medications which have NOT CHANGED    Details   albuterol (PROVENTIL HFA,VENTOLIN HFA) 90 mcg/act inhaler Inhale 2 puffs 4 (four) times a day as needed, Starting Wed 1/19/2022, Until Thu 1/19/2023 at 2359, Historical Med      ALPRAZolam (XANAX) 0 25 mg tablet Take 0 25 mg by mouth, Starting Wed 2/16/2022, Until Mon 8/15/2022 at 2359, Historical Med      !! cephalexin (KEFLEX) 500 mg capsule TAKE 1 CAPSULE BY MOUTH FOUR TIMES A DAY FOR 7 DAYS, Historical Med      diclofenac (VOLTAREN) 75 mg EC tablet Take 75 mg by mouth, Starting Wed 5/18/2022, Until Thu 5/18/2023 at 2359, Historical Med      famotidine (PEPCID) 40 MG tablet Take 40 mg by mouth daily, Starting Wed 1/19/2022, Until Thu 1/19/2023, Historical Med      fluticasone (FLONASE) 50 mcg/act nasal spray 1 spray into each nostril daily, Starting Mon 7/18/2022, Normal      gabapentin (NEURONTIN) 300 mg capsule Take 300 mg by mouth, Starting Tue 7/5/2022, Historical Med      loperamide (IMODIUM) 2 mg capsule Take 1 capsule (2 mg total) by mouth 4 (four) times a day as needed for diarrhea, Starting Mon 7/18/2022, Normal      methocarbamol (ROBAXIN) 500 mg tablet TAKE 1 TABLET BY MOUTH 4 TIMES A DAY AS NEEDED FOR MUSCLE SPASMS , Historical Med      montelukast (SINGULAIR) 10 mg tablet TAKE 1 TABLET BY MOUTH EVERY DAY AT NIGHT, Historical Med      naproxen (NAPROSYN) 500 mg tablet Take 1 tablet (500 mg total) by mouth 2 (two) times a day with meals, Starting Mon 7/18/2022, Normal      ranitidine (ZANTAC) 150 mg tablet Take 0 5 tablets (75 mg total) by mouth 2 (two) times a day as needed for heartburn for up to 30 days, Starting Mon 3/11/2019, Until Thu 6/17/2021 at 2359, Normal      sertraline (ZOLOFT) 25 mg tablet Take 25 mg by mouth daily, Starting Wed 5/25/2022, Historical Med      ZADITOR 0 025 % ophthalmic solution instill 1 drop into both eyes twice a day if needed for ITCHY EYES FOR UP TO 90 DAYS, Historical Med       !! - Potential duplicate medications found  Please discuss with provider  No discharge procedures on file      PDMP Review       Value Time User    PDMP Reviewed  Yes 2/25/2020 10:44 AM Lala Edmondson PA-C          ED Provider  Electronically Signed by           Dennis House MD  07/30/22 [id number]

## 2022-08-15 ENCOUNTER — TELEPHONE (OUTPATIENT)
Dept: OBGYN CLINIC | Facility: HOSPITAL | Age: 37
End: 2022-08-15

## 2022-09-14 ENCOUNTER — HOSPITAL ENCOUNTER (EMERGENCY)
Facility: HOSPITAL | Age: 37
Discharge: HOME | End: 2022-09-14
Attending: EMERGENCY MEDICINE
Payer: COMMERCIAL

## 2022-09-14 VITALS
TEMPERATURE: 98.4 F | DIASTOLIC BLOOD PRESSURE: 55 MMHG | SYSTOLIC BLOOD PRESSURE: 100 MMHG | OXYGEN SATURATION: 96 % | HEART RATE: 79 BPM | RESPIRATION RATE: 16 BRPM

## 2022-09-14 DIAGNOSIS — D53.9 MACROCYTIC ANEMIA: ICD-10-CM

## 2022-09-14 DIAGNOSIS — G43.909 MIGRAINE HEADACHE: Primary | ICD-10-CM

## 2022-09-14 DIAGNOSIS — N12 PYELONEPHRITIS: ICD-10-CM

## 2022-09-14 LAB
ANION GAP SERPL CALCULATED.3IONS-SCNC: 7 MMOL/L (ref 4–13)
ANISOCYTOSIS BLD QL SMEAR: PRESENT
BACTERIA UR QL AUTO: ABNORMAL /HPF
BASOPHILS # BLD MANUAL: 0 THOUSAND/UL (ref 0–0.1)
BASOPHILS NFR MAR MANUAL: 0 % (ref 0–1)
BILIRUB UR QL STRIP: ABNORMAL
BUN SERPL-MCNC: 12 MG/DL (ref 5–25)
CALCIUM SERPL-MCNC: 8.1 MG/DL (ref 8.3–10.1)
CHLORIDE SERPL-SCNC: 102 MMOL/L (ref 96–108)
CLARITY UR: ABNORMAL
CO2 SERPL-SCNC: 24 MMOL/L (ref 21–32)
COLOR UR: YELLOW
COLOR, POC: NORMAL
CREAT SERPL-MCNC: 0.98 MG/DL (ref 0.6–1.3)
EOSINOPHIL # BLD MANUAL: 0.3 THOUSAND/UL (ref 0–0.4)
EOSINOPHIL NFR BLD MANUAL: 2 % (ref 0–6)
ERYTHROCYTE [DISTWIDTH] IN BLOOD BY AUTOMATED COUNT: 16.8 % (ref 11.6–15.1)
EXT PREG TEST URINE: NEGATIVE
EXT. CONTROL ED NAV: NORMAL
FLUAV RNA RESP QL NAA+PROBE: NEGATIVE
FLUBV RNA RESP QL NAA+PROBE: NEGATIVE
GFR SERPL CREATININE-BSD FRML MDRD: 74 ML/MIN/1.73SQ M
GLUCOSE SERPL-MCNC: 110 MG/DL (ref 65–140)
GLUCOSE UR STRIP-MCNC: NEGATIVE MG/DL
HCT VFR BLD AUTO: 31 % (ref 34.8–46.1)
HGB BLD-MCNC: 10.3 G/DL (ref 11.5–15.4)
HGB UR QL STRIP.AUTO: ABNORMAL
KETONES UR STRIP-MCNC: ABNORMAL MG/DL
LEUKOCYTE ESTERASE UR QL STRIP: ABNORMAL
LYMPHOCYTES # BLD AUTO: 14 % (ref 14–44)
LYMPHOCYTES # BLD AUTO: 2.07 THOUSAND/UL (ref 0.6–4.47)
MACROCYTES BLD QL AUTO: PRESENT
MCH RBC QN AUTO: 37.1 PG (ref 26.8–34.3)
MCHC RBC AUTO-ENTMCNC: 33.2 G/DL (ref 31.4–37.4)
MCV RBC AUTO: 112 FL (ref 82–98)
MONOCYTES # BLD AUTO: 0.59 THOUSAND/UL (ref 0–1.22)
MONOCYTES NFR BLD: 4 % (ref 4–12)
MUCOUS THREADS UR QL AUTO: ABNORMAL
NEUTROPHILS # BLD MANUAL: 11.66 THOUSAND/UL (ref 1.85–7.62)
NEUTS BAND NFR BLD MANUAL: 9 % (ref 0–8)
NEUTS SEG NFR BLD AUTO: 70 % (ref 43–75)
NITRITE UR QL STRIP: POSITIVE
NON-SQ EPI CELLS URNS QL MICRO: ABNORMAL /HPF
PH UR STRIP.AUTO: 6 [PH] (ref 4.5–8)
PLATELET # BLD AUTO: 271 THOUSANDS/UL (ref 149–390)
PLATELET BLD QL SMEAR: ADEQUATE
PMV BLD AUTO: 9.4 FL (ref 8.9–12.7)
POTASSIUM SERPL-SCNC: 2.9 MMOL/L (ref 3.5–5.3)
PROT UR STRIP-MCNC: ABNORMAL MG/DL
RBC # BLD AUTO: 2.78 MILLION/UL (ref 3.81–5.12)
RBC #/AREA URNS AUTO: ABNORMAL /HPF
RBC MORPH BLD: PRESENT
RSV RNA RESP QL NAA+PROBE: NEGATIVE
SARS-COV-2 RNA RESP QL NAA+PROBE: NEGATIVE
SODIUM SERPL-SCNC: 133 MMOL/L (ref 135–147)
SP GR UR STRIP.AUTO: 1.01 (ref 1–1.03)
UROBILINOGEN UR QL STRIP.AUTO: >=8 E.U./DL
VARIANT LYMPHS # BLD AUTO: 1 %
WBC # BLD AUTO: 14.76 THOUSAND/UL (ref 4.31–10.16)
WBC #/AREA URNS AUTO: ABNORMAL /HPF

## 2022-09-14 PROCEDURE — 85027 COMPLETE CBC AUTOMATED: CPT

## 2022-09-14 PROCEDURE — 99284 EMERGENCY DEPT VISIT MOD MDM: CPT | Performed by: EMERGENCY MEDICINE

## 2022-09-14 PROCEDURE — 87086 URINE CULTURE/COLONY COUNT: CPT

## 2022-09-14 PROCEDURE — 81001 URINALYSIS AUTO W/SCOPE: CPT

## 2022-09-14 PROCEDURE — 96375 TX/PRO/DX INJ NEW DRUG ADDON: CPT

## 2022-09-14 PROCEDURE — 80048 BASIC METABOLIC PNL TOTAL CA: CPT

## 2022-09-14 PROCEDURE — 85007 BL SMEAR W/DIFF WBC COUNT: CPT

## 2022-09-14 PROCEDURE — 36415 COLL VENOUS BLD VENIPUNCTURE: CPT

## 2022-09-14 PROCEDURE — 81025 URINE PREGNANCY TEST: CPT

## 2022-09-14 PROCEDURE — 96365 THER/PROPH/DIAG IV INF INIT: CPT

## 2022-09-14 PROCEDURE — 87186 SC STD MICRODIL/AGAR DIL: CPT

## 2022-09-14 PROCEDURE — 87077 CULTURE AEROBIC IDENTIFY: CPT

## 2022-09-14 PROCEDURE — 99283 EMERGENCY DEPT VISIT LOW MDM: CPT

## 2022-09-14 PROCEDURE — 0241U HB NFCT DS VIR RESP RNA 4 TRGT: CPT

## 2022-09-14 RX ORDER — POTASSIUM CHLORIDE 20 MEQ/1
40 TABLET, EXTENDED RELEASE ORAL ONCE
Status: COMPLETED | OUTPATIENT
Start: 2022-09-14 | End: 2022-09-14

## 2022-09-14 RX ORDER — DIPHENHYDRAMINE HYDROCHLORIDE 50 MG/ML
25 INJECTION INTRAMUSCULAR; INTRAVENOUS EVERY 8 HOURS PRN
Status: DISCONTINUED | OUTPATIENT
Start: 2022-09-14 | End: 2022-09-14 | Stop reason: HOSPADM

## 2022-09-14 RX ORDER — KETOROLAC TROMETHAMINE 30 MG/ML
30 INJECTION, SOLUTION INTRAMUSCULAR; INTRAVENOUS EVERY 8 HOURS
Status: DISCONTINUED | OUTPATIENT
Start: 2022-09-14 | End: 2022-09-14 | Stop reason: HOSPADM

## 2022-09-14 RX ORDER — CIPROFLOXACIN 500 MG/1
500 TABLET, FILM COATED ORAL 2 TIMES DAILY
Qty: 14 TABLET | Refills: 0 | Status: SHIPPED | OUTPATIENT
Start: 2022-09-14 | End: 2022-09-21

## 2022-09-14 RX ORDER — METOCLOPRAMIDE HYDROCHLORIDE 5 MG/ML
10 INJECTION INTRAMUSCULAR; INTRAVENOUS EVERY 8 HOURS SCHEDULED
Status: DISCONTINUED | OUTPATIENT
Start: 2022-09-14 | End: 2022-09-14

## 2022-09-14 RX ORDER — METOCLOPRAMIDE HYDROCHLORIDE 5 MG/ML
10 INJECTION INTRAMUSCULAR; INTRAVENOUS ONCE
Status: COMPLETED | OUTPATIENT
Start: 2022-09-14 | End: 2022-09-14

## 2022-09-14 RX ORDER — ACETAMINOPHEN 325 MG/1
650 TABLET ORAL ONCE
Status: COMPLETED | OUTPATIENT
Start: 2022-09-14 | End: 2022-09-14

## 2022-09-14 RX ORDER — POTASSIUM CHLORIDE 14.9 MG/ML
20 INJECTION INTRAVENOUS ONCE
Status: DISCONTINUED | OUTPATIENT
Start: 2022-09-14 | End: 2022-09-14

## 2022-09-14 RX ADMIN — POTASSIUM CHLORIDE 40 MEQ: 1500 TABLET, EXTENDED RELEASE ORAL at 20:16

## 2022-09-14 RX ADMIN — KETOROLAC TROMETHAMINE 30 MG: 30 INJECTION, SOLUTION INTRAMUSCULAR at 18:53

## 2022-09-14 RX ADMIN — ACETAMINOPHEN 650 MG: 325 TABLET ORAL at 18:53

## 2022-09-14 RX ADMIN — METOCLOPRAMIDE HYDROCHLORIDE 10 MG: 5 INJECTION INTRAMUSCULAR; INTRAVENOUS at 18:55

## 2022-09-14 RX ADMIN — CEFTRIAXONE 1000 MG: 10 INJECTION, POWDER, FOR SOLUTION INTRAVENOUS at 20:16

## 2022-09-14 NOTE — DISCHARGE INSTRUCTIONS
You have a kidney infection  You need antibiotics for this  You should also follow up with your primary care physician regarding anemia      Return if any worsening headaches or fevers or inabilty to eat

## 2022-09-14 NOTE — ED ATTENDING ATTESTATION
Final Diagnosis:  1  Migraine headache    2  Pyelonephritis      ED Course as of 09/14/22 1923   Wed Sep 14, 2022   1918 Leukocytes, UA(!): Small    Nitrite, UA(!): Positive    MUCUS THREADS(!): Occasional    Bacteria, UA(!): Innumerable    Given systemic symptoms, will treat as pyelonephritis  Brennan Easton MD, saw and evaluated the patient  All available labs and X-rays were ordered by me or the resident and have been reviewed by myself  I discussed the patient with the resident / non-physician and agree with the resident's / non-physician practitioner's findings and plan as documented in the resident's / non-physician practicitioner's note, except where noted  At this point, I agree with the current assessment done in the ED  I was present during key portions of all procedures performed unless otherwise stated  Chief Complaint   Patient presents with    Migraine     Pt reports migraine, fever/chills, sweats, N/V, and burning with urination     This is a 40 y o  female presenting for evaluation of migraine headache x3 days, gradually getting worse  Also fever/chills and describes rigors at times; these symptoms already resolved  Hx of migraines that feels exactly like this  Not maximal in onset  No cp/sob  Denies any upper respiratory tract infection symptoms (cough, congestion, rhinorrhea, sore throat)  Burning with urination  PMH:   has a past medical history of Asthma, GERD (gastroesophageal reflux disease), Migraine, and Pregnancy induced hypertension  PSH:   has a past surgical history that includes  section; Tubal ligation; and Induced       Social:  Social History     Substance and Sexual Activity   Alcohol Use Not Currently    Comment: social     Social History     Tobacco Use   Smoking Status Never Smoker   Smokeless Tobacco Never Used     Social History     Substance and Sexual Activity   Drug Use Never     PE:  Vitals:    22 1759 BP: 104/55   BP Location: Right arm   Pulse: 103   Resp: 18   Temp: 98 4 °F (36 9 °C)   TempSrc: Oral   SpO2: 93%   General: VSS, NAD, awake, alert  Well-nourished, well-developed  Appears stated age  Head: Normocephalic, atraumatic, nontender  Eyes: PERRL, EOM-I  No diplopia  No hyphema  No subconjunctival hemorrhages  Symmetrical lids  ENTAtraumatic external nose and ears  MMM  No stridor  Normal phonation  No drooling  Base of mouth is soft  No mastoid tenderness  Neck: Symmetric, trachea midline  No JVD  CV: Peripheral pulses +2 throughout  No chest wall tenderness  Lungs:   Unlabored   No retractions  No crepitus  No tachypnea  No paradoxical motion  Abd: +BS, soft, NT/ND    MSK:   FROM   Back:   No CVAT  Skin: Dry, intact  Neuro: AAOx3, GCS 15, CN II-XII grossly intact  Motor grossly intact  Sensory grossly intact  Psychiatric/Behavioral: Appropriate mood and affect   Exam: deferred  A:  - Migraine  - Burning urine  P:  - Check for infection  - symptomatic measures ? complete resolution of headach after migraine cocktail  - 13 point ROS was performed and all are normal unless stated in the history above  - Nursing note reviewed  Vitals reviewed  - Orders placed by myself and/or advanced practitioner / resident     - Previous chart was reviewed  - No language barrier    - History obtained from patient  - There are no limitations to the history obtained  - Critical care time: Not applicable for this patient       Code Status: No Order  Advance Directive and Living Will:      Power of :    POLST:      Medications   ketorolac (TORADOL) injection 30 mg (30 mg Intravenous Given 9/14/22 1853)   diphenhydrAMINE (BENADRYL) injection 25 mg (has no administration in time range)   potassium chloride (K-DUR,KLOR-CON) CR tablet 40 mEq (has no administration in time range)   potassium chloride 20 mEq IVPB (premix) (has no administration in time range) acetaminophen (TYLENOL) tablet 650 mg (650 mg Oral Given 9/14/22 1853)   metoclopramide (REGLAN) injection 10 mg (10 mg Intravenous Given 9/14/22 1855)     No orders to display     Orders Placed This Encounter   Procedures    FLU/RSV/COVID - if FLU/RSV clinically relevant    Urine culture    CBC and differential    Basic metabolic panel    Urine Microscopic    POCT pregnancy, urine    POCT urinalysis dipstick     Labs Reviewed   CBC AND DIFFERENTIAL - Abnormal       Result Value Ref Range Status    WBC 14 76 (*) 4 31 - 10 16 Thousand/uL Preliminary    RBC 2 78 (*) 3 81 - 5 12 Million/uL Preliminary    Hemoglobin 10 3 (*) 11 5 - 15 4 g/dL Preliminary    Hematocrit 31 0 (*) 34 8 - 46 1 % Preliminary     (*) 82 - 98 fL Preliminary    MCH 37 1 (*) 26 8 - 34 3 pg Preliminary    MCHC 33 2  31 4 - 37 4 g/dL Preliminary    RDW 16 8 (*) 11 6 - 15 1 % Preliminary    MPV 9 4  8 9 - 12 7 fL Preliminary    Platelets 302  890 - 390 Thousands/uL Preliminary   BASIC METABOLIC PANEL - Abnormal    Sodium 133 (*) 135 - 147 mmol/L Final    Potassium 2 9 (*) 3 5 - 5 3 mmol/L Final    Comment: Slightly Hemolyzed; Results May be Affected    Chloride 102  96 - 108 mmol/L Final    CO2 24  21 - 32 mmol/L Final    ANION GAP 7  4 - 13 mmol/L Final    BUN 12  5 - 25 mg/dL Final    Creatinine 0 98  0 60 - 1 30 mg/dL Final    Comment: Standardized to IDMS reference method    Glucose 110  65 - 140 mg/dL Final    Comment: If the patient is fasting, the ADA then defines impaired fasting glucose as > 100 mg/dL and diabetes as > or equal to 123 mg/dL  Specimen collection should occur prior to Sulfasalazine administration due to the potential for falsely depressed results  Specimen collection should occur prior to Sulfapyridine administration due to the potential for falsely elevated results      Calcium 8 1 (*) 8 3 - 10 1 mg/dL Final    eGFR 74  ml/min/1 73sq m Final    Narrative:     [place] guidelines for Chronic Kidney Disease (CKD):     Stage 1 with normal or high GFR (GFR > 90 mL/min/1 73 square meters)    Stage 2 Mild CKD (GFR = 60-89 mL/min/1 73 square meters)    Stage 3A Moderate CKD (GFR = 45-59 mL/min/1 73 square meters)    Stage 3B Moderate CKD (GFR = 30-44 mL/min/1 73 square meters)    Stage 4 Severe CKD (GFR = 15-29 mL/min/1 73 square meters)    Stage 5 End Stage CKD (GFR <15 mL/min/1 73 square meters)  Note: GFR calculation is accurate only with a steady state creatinine   URINE MICROSCOPIC - Abnormal    RBC, UA 2-4 (*) None Seen, 1-2 /hpf Final    WBC, UA Innumerable (*) None Seen, 1-2 /hpf Final    Epithelial Cells Occasional  None Seen, Occasional /hpf Final    Bacteria, UA Innumerable (*) None Seen, Occasional /hpf Final    MUCUS THREADS Occasional (*) None Seen Final   URINE MACROSCOPIC, POC - Abnormal    Color, UA Yellow   Final    Clarity, UA Slightly Cloudy   Final    pH, UA 6 0  4 5 - 8 0 Final    Leukocytes, UA Small (*) Negative Final    Nitrite, UA Positive (*) Negative Final    Protein, UA 30 (1+) (*) Negative mg/dl Final    Glucose, UA Negative  Negative mg/dl Final    Ketones, UA Trace (*) Negative mg/dl Final    Urobilinogen, UA >=8 0 (*) 0 2, 1 0 E U /dl E U /dl Final    Bilirubin, UA Small (*) Negative Final    Occult Blood, UA Small (*) Negative Final    Specific [place], UA 1 015  1 003 - 1 030 Final    Narrative:     CLINITEK RESULT   POCT PREGNANCY, URINE - Normal    EXT PREG TEST UR (Ref: Negative) Negative   Final    Control valid   Final   POCT URINALYSIS DIPSTICK - Normal    Color, UA -   Final   COVID19, INFLUENZA A/B, RSV PCR, [facility]   URINE CULTURE     Time reflects when diagnosis was documented in both MDM as applicable and the Disposition within this note     Time User Action Codes Description Comment    9/14/2022  7:22 PM Hima Auguste Add [G4 909] Migraine headache     9/14/2022  7:22 PM Hima Auguste Add [N12] Pyelonephritis       ED Disposition     ED Disposition   Discharge    Condition   Stable    Date/Time   Wed Sep 14, 2022  7:22 PM    Comment   Elayne Busby discharge to home/self care  Follow-up Information    None       Patient's Medications   Discharge Prescriptions    No medications on file     No discharge procedures on file  Prior to Admission Medications   Prescriptions Last Dose Informant Patient Reported? Taking? ALPRAZolam (XANAX) 0 25 mg tablet   Yes No   Sig: Take 0 25 mg by mouth   ZADITOR 0 025 % ophthalmic solution   Yes No   Sig: instill 1 drop into both eyes twice a day if needed for ITCHY EYES FOR UP TO 90 DAYS   albuterol (PROVENTIL HFA,VENTOLIN HFA) 90 mcg/act inhaler   Yes No   Sig: Inhale 2 puffs 4 (four) times a day as needed   cephalexin (KEFLEX) 500 mg capsule   Yes No   Sig: TAKE 1 CAPSULE BY MOUTH FOUR TIMES A DAY FOR 7 DAYS   diclofenac (VOLTAREN) 75 mg EC tablet   Yes No   Sig: Take 75 mg by mouth   famotidine (PEPCID) 40 MG tablet   Yes No   Sig: Take 40 mg by mouth daily   fluticasone (FLONASE) 50 mcg/act nasal spray   No No   Si spray into each nostril daily   gabapentin (NEURONTIN) 300 mg capsule   Yes No   Sig: Take 300 mg by mouth   loperamide (IMODIUM) 2 mg capsule   No No   Sig: Take 1 capsule (2 mg total) by mouth 4 (four) times a day as needed for diarrhea   methocarbamol (ROBAXIN) 500 mg tablet   Yes No   Sig: TAKE 1 TABLET BY MOUTH 4 TIMES A DAY AS NEEDED FOR MUSCLE SPASMS     montelukast (SINGULAIR) 10 mg tablet   Yes No   Sig: TAKE 1 TABLET BY MOUTH EVERY DAY AT NIGHT   naproxen (NAPROSYN) 500 mg tablet   No No   Sig: Take 1 tablet (500 mg total) by mouth 2 (two) times a day with meals   ranitidine (ZANTAC) 150 mg tablet   No No   Sig: Take 0 5 tablets (75 mg total) by mouth 2 (two) times a day as needed for heartburn for up to 30 days   sertraline (ZOLOFT) 25 mg tablet   Yes No   Sig: Take 25 mg by mouth daily      Facility-Administered Medications: None       Portions of the record may have been created with voice recognition software  Occasional wrong word or "sound a like" substitutions may have occurred due to the inherent limitations of voice recognition software  Read the chart carefully and recognize, using context, where substitutions have occurred      Electronically signed by:  Hussain Silva Starting Wed 2/16/2022, Until Mon 8/15/2022 at 2359, Historical Med      cephalexin (KEFLEX) 500 mg capsule TAKE 1 CAPSULE BY MOUTH FOUR TIMES A DAY FOR 7 DAYS, Historical Med      diclofenac (VOLTAREN) 75 mg EC tablet Take 75 mg by mouth, Starting Wed 5/18/2022, Until Thu 5/18/2023 at 2359, Historical Med      famotidine (PEPCID) 40 MG tablet Take 40 mg by mouth daily, Starting Wed 1/19/2022, Until Thu 1/19/2023, Historical Med      fluticasone (FLONASE) 50 mcg/act nasal spray 1 spray into each nostril daily, Starting Mon 7/18/2022, Normal      gabapentin (NEURONTIN) 300 mg capsule Take 300 mg by mouth, Starting Tue 7/5/2022, Historical Med      loperamide (IMODIUM) 2 mg capsule Take 1 capsule (2 mg total) by mouth 4 (four) times a day as needed for diarrhea, Starting Mon 7/18/2022, Normal      methocarbamol (ROBAXIN) 500 mg tablet TAKE 1 TABLET BY MOUTH 4 TIMES A DAY AS NEEDED FOR MUSCLE SPASMS , Historical Med      montelukast (SINGULAIR) 10 mg tablet TAKE 1 TABLET BY MOUTH EVERY DAY AT NIGHT, Historical Med      naproxen (NAPROSYN) 500 mg tablet Take 1 tablet (500 mg total) by mouth 2 (two) times a day with meals, Starting Mon 7/18/2022, Normal      ranitidine (ZANTAC) 150 mg tablet Take 0 5 tablets (75 mg total) by mouth 2 (two) times a day as needed for heartburn for up to 30 days, Starting Mon 3/11/2019, Until Thu 6/17/2021 at 2359, Normal      sertraline (ZOLOFT) 25 mg tablet Take 25 mg by mouth daily, Starting Wed 5/25/2022, Historical Med      ZADITOR 0 025 % ophthalmic solution instill 1 drop into both eyes twice a day if needed for ITCHY EYES FOR UP TO 90 DAYS, Historical Med           No discharge procedures on file  Prior to Admission Medications   Prescriptions Last Dose Informant Patient Reported? Taking?    ALPRAZolam (XANAX) 0 25 mg tablet   Yes No   Sig: Take 0 25 mg by mouth   ZADITOR 0 025 % ophthalmic solution   Yes No   Sig: instill 1 drop into both eyes twice a day if needed for ITCHY EYES FOR UP TO 90 DAYS   albuterol (PROVENTIL HFA,VENTOLIN HFA) 90 mcg/act inhaler   Yes No   Sig: Inhale 2 puffs 4 (four) times a day as needed   cephalexin (KEFLEX) 500 mg capsule   Yes No   Sig: TAKE 1 CAPSULE BY MOUTH FOUR TIMES A DAY FOR 7 DAYS   diclofenac (VOLTAREN) 75 mg EC tablet   Yes No   Sig: Take 75 mg by mouth   famotidine (PEPCID) 40 MG tablet   Yes No   Sig: Take 40 mg by mouth daily   fluticasone (FLONASE) 50 mcg/act nasal spray   No No   Si spray into each nostril daily   gabapentin (NEURONTIN) 300 mg capsule   Yes No   Sig: Take 300 mg by mouth   loperamide (IMODIUM) 2 mg capsule   No No   Sig: Take 1 capsule (2 mg total) by mouth 4 (four) times a day as needed for diarrhea   methocarbamol (ROBAXIN) 500 mg tablet   Yes No   Sig: TAKE 1 TABLET BY MOUTH 4 TIMES A DAY AS NEEDED FOR MUSCLE SPASMS  montelukast (SINGULAIR) 10 mg tablet   Yes No   Sig: TAKE 1 TABLET BY MOUTH EVERY DAY AT NIGHT   naproxen (NAPROSYN) 500 mg tablet   No No   Sig: Take 1 tablet (500 mg total) by mouth 2 (two) times a day with meals   ranitidine (ZANTAC) 150 mg tablet   No No   Sig: Take 0 5 tablets (75 mg total) by mouth 2 (two) times a day as needed for heartburn for up to 30 days   sertraline (ZOLOFT) 25 mg tablet   Yes No   Sig: Take 25 mg by mouth daily      Facility-Administered Medications: None       Portions of the record may have been created with voice recognition software  Occasional wrong word or "sound a like" substitutions may have occurred due to the inherent limitations of voice recognition software  Read the chart carefully and recognize, using context, where substitutions have occurred      Electronically signed by:  Aurea Rodriguez

## 2022-09-14 NOTE — ED PROVIDER NOTES
History  Chief Complaint   Patient presents with    Migraine     Pt reports migraine, fever/chills, sweats, N/V, and burning with urination     HPI  Francisco Alfaro is a 40 y o  female with PMH significant for migraines, asthma coming in today with complaint of migraines  She reports this started three days ago  No inciting event or injury  Reports progressive worsening, however than mild improvement  She also reports associated chills, and some whole-body shaking episodes which resolved spontaneously  She does remember having these episodes, no loss of consciousness urinary incontinence or tongue biting  She has not recorded fever at home  She notes some dysuria as well, no vaginal bleeding or discharge  She is currently sexually active, uses condoms for birth control  LMP one month ago  No alleviating factors  Reports her headache is severe  Reports she has a history of headaches, no sudden onset or maximal intensity  Otherwise denies any chest pain, shortness of breath, episodes of syncope  Denies any recent medication changes  No other complaints at this time       -No language barrier  -PFH/PSH reviewed  - History obtained from patient and chart    Prior to Admission Medications   Prescriptions Last Dose Informant Patient Reported? Taking?    ALPRAZolam (XANAX) 0 25 mg tablet   Yes No   Sig: Take 0 25 mg by mouth   ZADITOR 0 025 % ophthalmic solution   Yes No   Sig: instill 1 drop into both eyes twice a day if needed for ITCHY EYES FOR UP TO 90 DAYS   albuterol (PROVENTIL HFA,VENTOLIN HFA) 90 mcg/act inhaler   Yes No   Sig: Inhale 2 puffs 4 (four) times a day as needed   cephalexin (KEFLEX) 500 mg capsule   Yes No   Sig: TAKE 1 CAPSULE BY MOUTH FOUR TIMES A DAY FOR 7 DAYS   diclofenac (VOLTAREN) 75 mg EC tablet   Yes No   Sig: Take 75 mg by mouth   famotidine (PEPCID) 40 MG tablet   Yes No   Sig: Take 40 mg by mouth daily   fluticasone (FLONASE) 50 mcg/act nasal spray   No No   Si spray into each nostril daily   gabapentin (NEURONTIN) 300 mg capsule   Yes No   Sig: Take 300 mg by mouth   loperamide (IMODIUM) 2 mg capsule   No No   Sig: Take 1 capsule (2 mg total) by mouth 4 (four) times a day as needed for diarrhea   methocarbamol (ROBAXIN) 500 mg tablet   Yes No   Sig: TAKE 1 TABLET BY MOUTH 4 TIMES A DAY AS NEEDED FOR MUSCLE SPASMS  montelukast (SINGULAIR) 10 mg tablet   Yes No   Sig: TAKE 1 TABLET BY MOUTH EVERY DAY AT NIGHT   naproxen (NAPROSYN) 500 mg tablet   No No   Sig: Take 1 tablet (500 mg total) by mouth 2 (two) times a day with meals   ranitidine (ZANTAC) 150 mg tablet   No No   Sig: Take 0 5 tablets (75 mg total) by mouth 2 (two) times a day as needed for heartburn for up to 30 days   sertraline (ZOLOFT) 25 mg tablet   Yes No   Sig: Take 25 mg by mouth daily      Facility-Administered Medications: None       Past Medical History:   Diagnosis Date    Asthma     GERD (gastroesophageal reflux disease)     last assessed: 2014    Migraine     Pregnancy induced hypertension        Past Surgical History:   Procedure Laterality Date     SECTION      x 3    INDUCED       TUBAL LIGATION         Family History   Problem Relation Age of Onset    Asthma Mother     Migraines Mother     Osteoarthritis Mother     Cervical cancer Mother     Hypertension Mother     Multiple sclerosis Mother 61    Arthritis Father     Diabetes Father     No Known Problems Sister     No Known Problems Brother     Asthma Daughter     Asthma Son     Diabetes Maternal Grandmother     Heart failure Maternal Grandmother     Arthritis Maternal Grandmother      I have reviewed and agree with the history as documented      E-Cigarette/Vaping    E-Cigarette Use Never User      E-Cigarette/Vaping Substances    Nicotine No     THC No     CBD No     Flavoring No     Other No     Unknown No      Social History     Tobacco Use    Smoking status: Never Smoker    Smokeless tobacco: Never Used   Vaping Use    Vaping Use: Never used   Substance Use Topics    Alcohol use: Not Currently     Comment: social    Drug use: Never        Review of Systems   Constitutional: Positive for chills  Negative for fever  HENT: Negative for sore throat  Eyes: Negative for pain and visual disturbance  Respiratory: Negative for shortness of breath  Cardiovascular: Negative for chest pain and palpitations  Gastrointestinal: Negative for abdominal pain and vomiting  Genitourinary: Positive for dysuria  Musculoskeletal: Negative for back pain  Skin: Negative for color change and rash  Neurological: Positive for headaches  Negative for syncope  All other systems reviewed and are negative  Physical Exam  ED Triage Vitals   Temperature Pulse Respirations Blood Pressure SpO2   09/14/22 1756 09/14/22 1756 09/14/22 1756 09/14/22 1756 09/14/22 1756   98 4 °F (36 9 °C) 103 18 104/55 93 %      Temp Source Heart Rate Source Patient Position - Orthostatic VS BP Location FiO2 (%)   09/14/22 1756 09/14/22 1756 09/14/22 1756 09/14/22 1756 --   Oral Monitor Sitting Right arm       Pain Score       09/14/22 1842       8             Orthostatic Vital Signs  Vitals:    09/14/22 1756 09/14/22 2015   BP: 104/55 100/55   Pulse: 103 79   Patient Position - Orthostatic VS: Sitting Lying       Physical Exam  Vitals and nursing note reviewed  Constitutional:       General: She is not in acute distress  Appearance: She is well-developed  HENT:      Head: Normocephalic and atraumatic  Eyes:      Conjunctiva/sclera: Conjunctivae normal    Cardiovascular:      Rate and Rhythm: Normal rate and regular rhythm  Heart sounds: No murmur heard  Pulmonary:      Effort: Pulmonary effort is normal  No respiratory distress  Breath sounds: Normal breath sounds  Abdominal:      Palpations: Abdomen is soft  Tenderness: There is abdominal tenderness in the suprapubic area  There is no guarding or rebound  Musculoskeletal:      Cervical back: Neck supple  Skin:     General: Skin is warm and dry  Neurological:      Mental Status: She is alert  ED Medications  Medications   ketorolac (TORADOL) injection 30 mg (30 mg Intravenous Given 9/14/22 1853)   diphenhydrAMINE (BENADRYL) injection 25 mg (has no administration in time range)   acetaminophen (TYLENOL) tablet 650 mg (650 mg Oral Given 9/14/22 1853)   metoclopramide (REGLAN) injection 10 mg (10 mg Intravenous Given 9/14/22 1855)   potassium chloride (K-DUR,KLOR-CON) CR tablet 40 mEq (40 mEq Oral Given 9/14/22 2016)   ceftriaxone (ROCEPHIN) 1 g/50 mL in dextrose IVPB (0 mg Intravenous Stopped 9/14/22 2046)       Diagnostic Studies  Results Reviewed     Procedure Component Value Units Date/Time    CBC and differential [[id number]]  (Abnormal) Collected: 09/14/22 1836    Lab Status: Final result Specimen: Blood from Arm, Left Updated: 09/14/22 1947     WBC 14 76 Thousand/uL      RBC 2 78 Million/uL      Hemoglobin 10 3 g/dL      Hematocrit 31 0 %       fL      MCH 37 1 pg      MCHC 33 2 g/dL      RDW 16 8 %      MPV 9 4 fL      Platelets 188 Thousands/uL     Narrative: This is an appended report  These results have been appended to a previously verified report      Manual Differential(PHLEBS Do Not Order) [[id number]]  (Abnormal) Collected: 09/14/22 1836    Lab Status: Final result Specimen: Blood from Arm, Left Updated: 09/14/22 1947     Segmented % 70 %      Bands % 9 %      Lymphocytes % 14 %      Monocytes % 4 %      Eosinophils, % 2 %      Basophils % 0 %      Atypical Lymphocytes % 1 %      Absolute Neutrophils 11 66 Thousand/uL      Lymphocytes Absolute 2 07 Thousand/uL      Monocytes Absolute 0 59 Thousand/uL      Eosinophils Absolute 0 30 Thousand/uL      Basophils Absolute 0 00 Thousand/uL      Total Counted --     RBC Morphology Present     Anisocytosis Present     Macrocytes Present     Platelet Estimate Adequate    FLU/RSV/COVID - if FLU/RSV clinically relevant [[id number]]  (Normal) Collected: 09/14/22 1836    Lab Status: Final result Specimen: Nares from Nose Updated: 09/14/22 1932     SARS-CoV-2 Negative     INFLUENZA A PCR Negative     INFLUENZA B PCR Negative     RSV PCR Negative    Narrative:      FOR PEDIATRIC PATIENTS - copy/paste COVID Guidelines URL to browser: [email or web address]    SARS-CoV-2 assay is a Nucleic Acid Amplification assay intended for the  qualitative detection of nucleic acid from SARS-CoV-2 in nasopharyngeal  swabs  Results are for the presumptive identification of SARS-CoV-2 RNA  Positive results are indicative of infection with SARS-CoV-2, the virus  causing COVID-19, but do not rule out bacterial infection or co-infection  with other viruses  Laboratories within the [place] and its  territories are required to report all positive results to the appropriate  public health authorities  Negative results do not preclude SARS-CoV-2  infection and should not be used as the sole basis for treatment or other  patient management decisions  Negative results must be combined with  clinical observations, patient history, and epidemiological information  This test has not been FDA cleared or approved  This test has been authorized by FDA under an Emergency Use Authorization  (EUA)  This test is only authorized for the duration of time the  declaration that circumstances exist justifying the authorization of the  emergency use of an in vitro diagnostic tests for detection of SARS-CoV-2  virus and/or diagnosis of COVID-19 infection under section 564(b)(1) of  the Act, 21 U  S C  441MCH-3(S)(2), unless the authorization is terminated  or revoked sooner  The test has been validated but independent review by FDA  and CLIA is pending  Test performed using [employer]: This RT-PCR assay targets N2,  a region unique to SARS-CoV-2   A conserved region in the E-gene was chosen  for pan-Sarbecovirus detection which includes SARS-CoV-2  Urine Microscopic [[id number]]  (Abnormal) Collected: 09/14/22 1832    Lab Status: Final result Specimen: Urine, Other Updated: 09/14/22 1917     RBC, UA 2-4 /hpf      WBC, UA Innumerable /hpf      Epithelial Cells Occasional /hpf      Bacteria, UA Innumerable /hpf      MUCUS THREADS Occasional    Urine culture [[id number]] Collected: 09/14/22 1832    Lab Status:  In process Specimen: Urine, Other Updated: 09/14/22 6660    Basic metabolic panel [[id number]]  (Abnormal) Collected: 09/14/22 1836    Lab Status: Final result Specimen: Blood from Arm, Left Updated: 09/14/22 1912     Sodium 133 mmol/L      Potassium 2 9 mmol/L      Chloride 102 mmol/L      CO2 24 mmol/L      ANION GAP 7 mmol/L      BUN 12 mg/dL      Creatinine 0 98 mg/dL      Glucose 110 mg/dL      Calcium 8 1 mg/dL      eGFR 74 ml/min/1 73sq m     Narrative:      [place] guidelines for Chronic Kidney Disease (CKD):     Stage 1 with normal or high GFR (GFR > 90 mL/min/1 73 square meters)    Stage 2 Mild CKD (GFR = 60-89 mL/min/1 73 square meters)    Stage 3A Moderate CKD (GFR = 45-59 mL/min/1 73 square meters)    Stage 3B Moderate CKD (GFR = 30-44 mL/min/1 73 square meters)    Stage 4 Severe CKD (GFR = 15-29 mL/min/1 73 square meters)    Stage 5 End Stage CKD (GFR <15 mL/min/1 73 square meters)  Note: GFR calculation is accurate only with a steady state creatinine    POCT urinalysis dipstick [[id number]]  (Normal) Resulted: 09/14/22 1836    Lab Status: Final result Specimen: Urine Updated: 09/14/22 1836     Color, UA -    POCT pregnancy, urine [[id number]]  (Normal) Resulted: 09/14/22 1836    Lab Status: Final result Updated: 09/14/22 1836     EXT PREG TEST UR (Ref: Negative) Negative     Control valid    Urine Macroscopic, POC [[id number]]  (Abnormal) Collected: 09/14/22 1832    Lab Status: Final result Specimen: Urine Updated: 09/14/22 1834 Color, UA Yellow     Clarity, UA Slightly Cloudy     pH, UA 6 0     Leukocytes, UA Small     Nitrite, UA Positive     Protein, UA 30 (1+) mg/dl      Glucose, UA Negative mg/dl      Ketones, UA Trace mg/dl      Urobilinogen, UA >=8 0 E U /dl      Bilirubin, UA Small     Occult Blood, UA Small     Specific [place], UA 1 015    Narrative:      CLINITEK RESULT                 No orders to display         Procedures  Procedures      ED Course  ED Course as of 09/14/22 2210   Wed Sep 14, 2022   1844 PREGNANCY TEST URINE: Negative   1844 Nitrite, UA(!): Positive   1847 Hemoglobin(!): 10 3   1848 MCV(!): 112   1914 Potassium(!): 2 9   1937 Pt symptoms much imrpoved  Discussed her anemia, likely related to bariatric surgery                                       MDM  Number of Diagnoses or Management Options  Macrocytic anemia  Migraine headache  Pyelonephritis  Diagnosis management comments: Maral Tapia is a 40 y o  who presents with complaints of migraine    Vital signs are stable, physical exam shows no abnormalities, benign neurological exam, overall well appearing    Ddx: Migraine vs COVID/Flu vs electrolyte abnormality vs UTI vs IUP  Less likely seizure given recollection of episode, benign physical exam (not post ictal, no tongue biting, etc  )    Plan: CBC, BMP, COVID/Flu, Migraine cocktail, UA, UPT    Summary: Workup consistent with UTI/pyelonephritis  Given her systemic symptoms, treated with Rocephin and Rx'd Cipro for home  Discussed return precautions  Also discussed the pts anemia may be related to her hx of gastric bypass surgery and recommended outpatient follow up  Given her benign remainder of workup and remaining HDS, felt safe to send home  Recommended close follow-up  Advised on close return precautions             Amount and/or Complexity of Data Reviewed  Clinical lab tests: ordered and reviewed  Tests in the radiology section of CPT®: ordered and reviewed    Risk of Complications, Morbidity, and/or Mortality  Presenting problems: moderate  Diagnostic procedures: low  Management options: low    Patient Progress  Patient progress: stable  I discussed the above care and treatment plan with the pt and answered all of their relevant questions  Reviewed test results and imaging findings, as well as pertinent details of the treatment plan as described above  She expressed understanding, was agreeable to the plan of care, and had no further questions or concerns  Portions of the record may have been created with voice recognition software  Occasional wrong word or "sound a like" substitutions may have occurred due to the inherent limitations of voice recognition software  Read the chart carefully and recognize, using context, where substitutions have occurred      Disposition  Final diagnoses:   Migraine headache   Pyelonephritis   Macrocytic anemia     Time reflects when diagnosis was documented in both MDM as applicable and the Disposition within this note     Time User Action Codes Description Comment    9/14/2022  7:22 PM Aleja Ramirez Add [G43 909] Migraine headache     9/14/2022  7:22 PM Aleja Ramirez Add [N12] Pyelonephritis     9/14/2022  7:59 PM Verena Duque Add [D53 9] Macrocytic anemia       ED Disposition     ED Disposition   Discharge    Condition   Stable    Date/Time   Wed Sep 14, 2022  7:22 PM    Comment   Lori Ramirez discharge to home/self care                 Follow-up Information     Follow up With Specialties Details Why Contact Info Additional [address] Emergency Department Emergency Medicine  If symptoms worsen [address] [id number]  3 [address] Emergency Department, [address], [place], [place], [address]          Discharge Medication List as of 9/14/2022  8:58 PM      START taking these medications    Details   ciprofloxacin (CIPRO) 500 mg tablet Take 1 tablet (500 mg total) by mouth 2 (two) times a day for 7 days, Starting Wed 9/14/2022, Until Wed 9/21/2022, Normal         CONTINUE these medications which have NOT CHANGED    Details   albuterol (PROVENTIL HFA,VENTOLIN HFA) 90 mcg/act inhaler Inhale 2 puffs 4 (four) times a day as needed, Starting Wed 1/19/2022, Until Thu 1/19/2023 at 2359, Historical Med      ALPRAZolam (XANAX) 0 25 mg tablet Take 0 25 mg by mouth, Starting Wed 2/16/2022, Until Mon 8/15/2022 at 2359, Historical Med      cephalexin (KEFLEX) 500 mg capsule TAKE 1 CAPSULE BY MOUTH FOUR TIMES A DAY FOR 7 DAYS, Historical Med      diclofenac (VOLTAREN) 75 mg EC tablet Take 75 mg by mouth, Starting Wed 5/18/2022, Until Thu 5/18/2023 at 2359, Historical Med      famotidine (PEPCID) 40 MG tablet Take 40 mg by mouth daily, Starting Wed 1/19/2022, Until Thu 1/19/2023, Historical Med      fluticasone (FLONASE) 50 mcg/act nasal spray 1 spray into each nostril daily, Starting Mon 7/18/2022, Normal      gabapentin (NEURONTIN) 300 mg capsule Take 300 mg by mouth, Starting Tue 7/5/2022, Historical Med      loperamide (IMODIUM) 2 mg capsule Take 1 capsule (2 mg total) by mouth 4 (four) times a day as needed for diarrhea, Starting Mon 7/18/2022, Normal      methocarbamol (ROBAXIN) 500 mg tablet TAKE 1 TABLET BY MOUTH 4 TIMES A DAY AS NEEDED FOR MUSCLE SPASMS , Historical Med      montelukast (SINGULAIR) 10 mg tablet TAKE 1 TABLET BY MOUTH EVERY DAY AT NIGHT, Historical Med      naproxen (NAPROSYN) 500 mg tablet Take 1 tablet (500 mg total) by mouth 2 (two) times a day with meals, Starting Mon 7/18/2022, Normal      ranitidine (ZANTAC) 150 mg tablet Take 0 5 tablets (75 mg total) by mouth 2 (two) times a day as needed for heartburn for up to 30 days, Starting Mon 3/11/2019, Until Thu 6/17/2021 at 2359, Normal      sertraline (ZOLOFT) 25 mg tablet Take 25 mg by mouth daily, Starting Wed 5/25/2022, Historical Med      ZADITOR 0 025 % ophthalmic solution instill 1 drop into both eyes twice a day if needed for ITCHY EYES FOR UP TO 90 DAYS, Historical Med           No discharge procedures on file  PDMP Review       Value Time User    PDMP Reviewed  Yes 2/25/2020 10:44 AM Elva Villa PA-C           ED Provider  Attending physically available and evaluated Richardine Riedel I managed the patient along with the ED Attending      Electronically Signed by         Feliz Mills MD  09/14/22 8641

## 2022-09-14 NOTE — Clinical Note
Diamond Covarrubias was seen and treated in our emergency department on 9/14/2022  Diagnosis:     Mihaela Nieves  may return to work on return date  She may return on this date: 09/15/2022         If you have any questions or concerns, please don't hesitate to call        Jad Sanchez MD    ______________________________           _______________          _______________  Hospital Representative                              Date                                Time

## 2022-09-16 LAB — BACTERIA UR CULT: ABNORMAL

## 2022-09-20 ENCOUNTER — APPOINTMENT (OUTPATIENT)
Dept: LAB | Facility: CLINIC | Age: 37
End: 2022-09-20
Payer: COMMERCIAL

## 2022-09-20 DIAGNOSIS — E87.6 HYPOPOTASSEMIA: ICD-10-CM

## 2022-09-20 DIAGNOSIS — Z00.00 ROUTINE GENERAL MEDICAL EXAMINATION AT A HEALTH CARE FACILITY: ICD-10-CM

## 2022-09-20 DIAGNOSIS — N94.6 DYSMENORRHEA: ICD-10-CM

## 2022-09-20 DIAGNOSIS — D53.9 SIMPLE CHRONIC ANEMIA: ICD-10-CM

## 2022-09-20 LAB
ALBUMIN SERPL BCP-MCNC: 2.5 G/DL (ref 3.5–5)
ALP SERPL-CCNC: 70 U/L (ref 46–116)
ALT SERPL W P-5'-P-CCNC: 22 U/L (ref 12–78)
ANION GAP SERPL CALCULATED.3IONS-SCNC: 6 MMOL/L (ref 4–13)
ANISOCYTOSIS BLD QL SMEAR: PRESENT
AST SERPL W P-5'-P-CCNC: 28 U/L (ref 5–45)
BASOPHILS # BLD MANUAL: 0.1 THOUSAND/UL (ref 0–0.1)
BASOPHILS NFR MAR MANUAL: 1 % (ref 0–1)
BILIRUB SERPL-MCNC: 0.17 MG/DL (ref 0.2–1)
BUN SERPL-MCNC: 7 MG/DL (ref 5–25)
CALCIUM ALBUM COR SERPL-MCNC: 9.8 MG/DL (ref 8.3–10.1)
CALCIUM SERPL-MCNC: 8.6 MG/DL (ref 8.3–10.1)
CHLORIDE SERPL-SCNC: 107 MMOL/L (ref 96–108)
CHOLEST SERPL-MCNC: 146 MG/DL
CO2 SERPL-SCNC: 26 MMOL/L (ref 21–32)
CREAT SERPL-MCNC: 0.76 MG/DL (ref 0.6–1.3)
EOSINOPHIL # BLD MANUAL: 0.31 THOUSAND/UL (ref 0–0.4)
EOSINOPHIL NFR BLD MANUAL: 3 % (ref 0–6)
ERYTHROCYTE [DISTWIDTH] IN BLOOD BY AUTOMATED COUNT: 16.8 % (ref 11.6–15.1)
EST. AVERAGE GLUCOSE BLD GHB EST-MCNC: 103 MG/DL
FERRITIN SERPL-MCNC: 224 NG/ML (ref 8–388)
FOLATE SERPL-MCNC: 2.8 NG/ML (ref 3.1–17.5)
GFR SERPL CREATININE-BSD FRML MDRD: 101 ML/MIN/1.73SQ M
GLUCOSE P FAST SERPL-MCNC: 85 MG/DL (ref 65–99)
HBA1C MFR BLD: 5.2 %
HCT VFR BLD AUTO: 32.7 % (ref 34.8–46.1)
HDLC SERPL-MCNC: 33 MG/DL
HGB BLD-MCNC: 10.5 G/DL (ref 11.5–15.4)
IRON SERPL-MCNC: 49 UG/DL (ref 50–170)
LDLC SERPL CALC-MCNC: 98 MG/DL (ref 0–100)
LYMPHOCYTES # BLD AUTO: 0.93 THOUSAND/UL (ref 0.6–4.47)
LYMPHOCYTES # BLD AUTO: 9 % (ref 14–44)
MACROCYTES BLD QL AUTO: PRESENT
MCH RBC QN AUTO: 36.2 PG (ref 26.8–34.3)
MCHC RBC AUTO-ENTMCNC: 32.1 G/DL (ref 31.4–37.4)
MCV RBC AUTO: 113 FL (ref 82–98)
METAMYELOCYTES NFR BLD MANUAL: 3 % (ref 0–1)
MONOCYTES # BLD AUTO: 0.93 THOUSAND/UL (ref 0–1.22)
MONOCYTES NFR BLD: 9 % (ref 4–12)
MYELOCYTES NFR BLD MANUAL: 1 % (ref 0–1)
NEUTROPHILS # BLD MANUAL: 7.67 THOUSAND/UL (ref 1.85–7.62)
NEUTS BAND NFR BLD MANUAL: 1 % (ref 0–8)
NEUTS SEG NFR BLD AUTO: 73 % (ref 43–75)
NONHDLC SERPL-MCNC: 113 MG/DL
PLATELET # BLD AUTO: 645 THOUSANDS/UL (ref 149–390)
PLATELET BLD QL SMEAR: ABNORMAL
PMV BLD AUTO: 9.1 FL (ref 8.9–12.7)
POLYCHROMASIA BLD QL SMEAR: PRESENT
POTASSIUM SERPL-SCNC: 3.4 MMOL/L (ref 3.5–5.3)
PROT SERPL-MCNC: 7.4 G/DL (ref 6.4–8.4)
RBC # BLD AUTO: 2.9 MILLION/UL (ref 3.81–5.12)
RBC MORPH BLD: PRESENT
SODIUM SERPL-SCNC: 139 MMOL/L (ref 135–147)
TARGETS BLD QL SMEAR: PRESENT
TIBC SERPL-MCNC: 246 UG/DL (ref 250–450)
TRIGL SERPL-MCNC: 76 MG/DL
VIT B12 SERPL-MCNC: 253 PG/ML (ref 100–900)
WBC # BLD AUTO: 10.36 THOUSAND/UL (ref 4.31–10.16)

## 2022-09-20 PROCEDURE — 83918 ORGANIC ACIDS TOTAL QUANT: CPT

## 2022-09-20 PROCEDURE — 83036 HEMOGLOBIN GLYCOSYLATED A1C: CPT

## 2022-09-20 PROCEDURE — 82746 ASSAY OF FOLIC ACID SERUM: CPT

## 2022-09-20 PROCEDURE — 85027 COMPLETE CBC AUTOMATED: CPT

## 2022-09-20 PROCEDURE — 82607 VITAMIN B-12: CPT

## 2022-09-20 PROCEDURE — 83550 IRON BINDING TEST: CPT

## 2022-09-20 PROCEDURE — 85007 BL SMEAR W/DIFF WBC COUNT: CPT

## 2022-09-20 PROCEDURE — 36415 COLL VENOUS BLD VENIPUNCTURE: CPT

## 2022-09-20 PROCEDURE — 82728 ASSAY OF FERRITIN: CPT

## 2022-09-20 PROCEDURE — 80061 LIPID PANEL: CPT

## 2022-09-20 PROCEDURE — 83540 ASSAY OF IRON: CPT

## 2022-09-20 PROCEDURE — 80053 COMPREHEN METABOLIC PANEL: CPT

## 2022-09-23 LAB — METHYLMALONATE SERPL-SCNC: 570 NMOL/L (ref 0–378)

## 2023-05-05 ENCOUNTER — APPOINTMENT (EMERGENCY)
Dept: RADIOLOGY | Facility: HOSPITAL | Age: 38
End: 2023-05-05

## 2023-05-05 ENCOUNTER — HOSPITAL ENCOUNTER (EMERGENCY)
Facility: HOSPITAL | Age: 38
Discharge: HOME | End: 2023-05-05
Attending: EMERGENCY MEDICINE

## 2023-05-05 VITALS
HEART RATE: 94 BPM | OXYGEN SATURATION: 96 % | RESPIRATION RATE: 14 BRPM | TEMPERATURE: 97.3 F | DIASTOLIC BLOOD PRESSURE: 68 MMHG | SYSTOLIC BLOOD PRESSURE: 164 MMHG

## 2023-05-05 DIAGNOSIS — S93.409A ANKLE SPRAIN: Primary | ICD-10-CM

## 2023-05-05 DIAGNOSIS — M79.671 FOOT PAIN, RIGHT: ICD-10-CM

## 2023-05-05 RX ORDER — ACETAMINOPHEN 325 MG/1
650 TABLET ORAL ONCE
Status: COMPLETED | OUTPATIENT
Start: 2023-05-05 | End: 2023-05-05

## 2023-05-05 RX ORDER — IBUPROFEN 400 MG/1
400 TABLET ORAL ONCE
Status: COMPLETED | OUTPATIENT
Start: 2023-05-05 | End: 2023-05-05

## 2023-05-05 RX ADMIN — IBUPROFEN 400 MG: 400 TABLET ORAL at 08:51

## 2023-05-05 RX ADMIN — ACETAMINOPHEN 650 MG: 325 TABLET ORAL at 08:51

## 2023-05-05 NOTE — Clinical Note
Fito Wang was seen and treated in our emergency department on 5/5/2023  No restrictions            Diagnosis:     Gómez    She may return on this date: 05/09/2023         If you have any questions or concerns, please don't hesitate to call        Jennifer Montenegro RN    ______________________________           _______________          _______________  Hospital Representative                              Date                                Time

## 2023-05-05 NOTE — DISCHARGE INSTRUCTIONS
Take Tylenol 500 mg and ibuprofen 400 mg every 6 hours as needed for pain  Keep foot elevated whenever possible  Call the orthopedic office in 1 week if you continue to have pain

## 2023-05-05 NOTE — ED ATTENDING ATTESTATION
5/5/2023  IAkin MD, saw and evaluated the patient  I have discussed the patient with the resident/non-physician practitioner and agree with the resident's/non-physician practitioner's findings, Plan of Care, and MDM as documented in the resident's/non-physician practitioner's note, except where noted  All available labs and Radiology studies were reviewed  I was present for key portions of any procedure(s) performed by the resident/non-physician practitioner and I was immediately available to provide assistance  At this point I agree with the current assessment done in the Emergency Department  I have conducted an independent evaluation of this patient a history and physical is as follows:    ED Course         Critical Care Time  Procedures    41 yo female with right foot and ankle pain after slipping on dogs urine  Pt with no fall  Pt able to ambulate with pain  Pt with hx of 5th metatarsal fx on right  Vss, afebrile, lungs cta, rrr, right  over 5th metatarsal, lateral malleolar tenderness, swelling, and fibular head tenderness  Xrays, pain meds

## 2023-05-05 NOTE — ED PROVIDER NOTES
"History  Chief Complaint   Patient presents with    Ankle Injury     Pt presents ambulatory c/o right ankle pain, edema noted  Pt states, \"I slipped on my dogs piss yesterday  \"     31-year-old female with history of pseudo Nance fracture of right foot in  presents with acute onset right ankle and right foot pain since yesterday after slipping on some liquid on the floor causing her to invert her right foot  Patient reports immediate pain to the right lateral ankle and right lateral midfoot  Patient has been ambulatory since the accident  Denies falling during the accident  Denies right hip, right knee pain  Prior to Admission Medications   Prescriptions Last Dose Informant Patient Reported? Taking? ALPRAZolam (XANAX) 0 25 mg tablet   Yes No   Sig: Take 0 25 mg by mouth   ZADITOR 0 025 % ophthalmic solution   Yes No   Sig: instill 1 drop into both eyes twice a day if needed for ITCHY EYES FOR UP TO 90 DAYS   cephalexin (KEFLEX) 500 mg capsule   Yes No   Sig: TAKE 1 CAPSULE BY MOUTH FOUR TIMES A DAY FOR 7 DAYS   diclofenac (VOLTAREN) 75 mg EC tablet   Yes No   Sig: Take 75 mg by mouth   famotidine (PEPCID) 40 MG tablet   Yes No   Sig: Take 40 mg by mouth daily   fluticasone (FLONASE) 50 mcg/act nasal spray   No No   Si spray into each nostril daily   gabapentin (NEURONTIN) 300 mg capsule   Yes No   Sig: Take 300 mg by mouth   loperamide (IMODIUM) 2 mg capsule   No No   Sig: Take 1 capsule (2 mg total) by mouth 4 (four) times a day as needed for diarrhea   methocarbamol (ROBAXIN) 500 mg tablet   Yes No   Sig: TAKE 1 TABLET BY MOUTH 4 TIMES A DAY AS NEEDED FOR MUSCLE SPASMS     montelukast (SINGULAIR) 10 mg tablet   Yes No   Sig: TAKE 1 TABLET BY MOUTH EVERY DAY AT NIGHT   naproxen (NAPROSYN) 500 mg tablet   No No   Sig: Take 1 tablet (500 mg total) by mouth 2 (two) times a day with meals   ranitidine (ZANTAC) 150 mg tablet   No No   Sig: Take 0 5 tablets (75 mg total) by mouth 2 (two) times a day " as needed for heartburn for up to 30 days   sertraline (ZOLOFT) 25 mg tablet   Yes No   Sig: Take 25 mg by mouth daily      Facility-Administered Medications: None       Past Medical History:   Diagnosis Date    Asthma     GERD (gastroesophageal reflux disease)     last assessed: 2014    Migraine     Pregnancy induced hypertension        Past Surgical History:   Procedure Laterality Date     SECTION      x 3    INDUCED       TUBAL LIGATION         Family History   Problem Relation Age of Onset    Asthma Mother     Migraines Mother     Osteoarthritis Mother     Cervical cancer Mother     Hypertension Mother     Multiple sclerosis Mother 61    Arthritis Father     Diabetes Father     No Known Problems Sister     No Known Problems Brother     Asthma Daughter     Asthma Son     Diabetes Maternal Grandmother     Heart failure Maternal Grandmother     Arthritis Maternal Grandmother      I have reviewed and agree with the history as documented  E-Cigarette/Vaping    E-Cigarette Use Never User      E-Cigarette/Vaping Substances    Nicotine No     THC No     CBD No     Flavoring No     Other No     Unknown No      Social History     Tobacco Use    Smoking status: Never    Smokeless tobacco: Never   Vaping Use    Vaping Use: Never used   Substance Use Topics    Alcohol use: Not Currently     Comment: social    Drug use: Never        Review of Systems   Constitutional: Negative for chills and fever  HENT: Negative for ear pain and sore throat  Eyes: Negative for pain and visual disturbance  Respiratory: Negative for cough and shortness of breath  Cardiovascular: Negative for chest pain and palpitations  Gastrointestinal: Negative for abdominal pain and vomiting  Genitourinary: Negative for dysuria and hematuria  Musculoskeletal: Positive for arthralgias  Negative for back pain  Skin: Negative for color change and rash     Neurological: Negative for seizures and syncope  All other systems reviewed and are negative  Physical Exam  ED Triage Vitals [05/05/23 0810]   Temperature Pulse Respirations Blood Pressure SpO2   (!) 97 3 °F (36 3 °C) 94 14 164/68 96 %      Temp Source Heart Rate Source Patient Position - Orthostatic VS BP Location FiO2 (%)   Temporal Monitor Sitting Right arm --      Pain Score       9             Orthostatic Vital Signs  Vitals:    05/05/23 0810   BP: 164/68   Pulse: 94   Patient Position - Orthostatic VS: Sitting       Physical Exam  Vitals and nursing note reviewed  Constitutional:       General: She is not in acute distress  Appearance: Normal appearance  She is normal weight  She is not ill-appearing, toxic-appearing or diaphoretic  HENT:      Head: Normocephalic and atraumatic  Right Ear: External ear normal       Left Ear: External ear normal       Nose: Nose normal       Mouth/Throat:      Mouth: Mucous membranes are moist    Eyes:      General:         Right eye: No discharge  Left eye: No discharge  Conjunctiva/sclera: Conjunctivae normal    Cardiovascular:      Rate and Rhythm: Normal rate  Pulses: Normal pulses  Pulmonary:      Effort: Pulmonary effort is normal  No respiratory distress  Abdominal:      Palpations: Abdomen is soft  Musculoskeletal:         General: Swelling present  No deformity  Normal range of motion  Comments: Tenderness, edema, and bruising over lateral malleolus of right foot  Tenderness at base of fifth metatarsal of right foot  Tenderness at fibular head right leg  Patient ambulatory without assistance  Skin:     General: Skin is warm and dry  Capillary Refill: Capillary refill takes less than 2 seconds  Coloration: Skin is not pale  Findings: Bruising present  Neurological:      Mental Status: She is alert and oriented to person, place, and time  Motor: No weakness     Psychiatric:         Mood and Affect: Mood normal  Behavior: Behavior normal          ED Medications  Medications   acetaminophen (TYLENOL) tablet 650 mg (650 mg Oral Given 5/5/23 0851)   ibuprofen (MOTRIN) tablet 400 mg (400 mg Oral Given 5/5/23 0851)       Diagnostic Studies  Results Reviewed     None                 XR knee 1 or 2 views right   ED Interpretation by Veronica Dowd MD (05/05 4349)   No fx      Final Result by Mauri Cohn MD (05/05 1138)      No acute osseous abnormality  Workstation performed: [id number]         XR ankle 3+ views RIGHT   ED Interpretation by Veronica Dowd MD (05/05 3224)   No fx      Final Result by Mauri Cohn MD (05/05 1142)      No acute osseous abnormality  Workstation performed: [id number]         XR foot 3+ views RIGHT   ED Interpretation by Veronica Dowd MD (05/05 1582)   No fx      Final Result by Mauri Cohn MD (05/05 1142)      No acute osseous abnormality  Workstation performed: [id number]               Procedures  Procedures      ED Course                             SBIRT 20yo+    Flowsheet Row Most Recent Value   Initial Alcohol Screen: US AUDIT-C     1  How often do you have a drink containing alcohol? 0 Filed at: 05/05/2023 0811   2  How many drinks containing alcohol do you have on a typical day you are drinking? 0 Filed at: 05/05/2023 0811   3a  Male UNDER 65: How often do you have five or more drinks on one occasion? 0 Filed at: 05/05/2023 0811   3b  FEMALE Any Age, or MALE 65+: How often do you have 4 or more drinks on one occassion? 0 Filed at: 05/05/2023 0811   Audit-C Score 0 Filed at: 05/05/2023 1701   FAB: How many times in the past year have you    Used an illegal drug or used a prescription medication for non-medical reasons? Never Filed at: 05/05/2023 8352                Medical Decision Making  81-year-old female presents with right ankle and foot pain after minor injury yesterday     Plan: Analgesia, x-rays to rule out acute ankle and foot fracture    Ankle sprain: acute illness or injury  Foot pain, right: acute illness or injury  Amount and/or Complexity of Data Reviewed  Radiology: ordered and independent interpretation performed  Risk  OTC drugs  Prescription drug management  Disposition  Final diagnoses: Ankle sprain   Foot pain, right     Time reflects when diagnosis was documented in both MDM as applicable and the Disposition within this note     Time User Action Codes Description Comment    5/5/2023  9:04 AM Jasminealberto Riveraar Add [C12 093A] Ankle sprain     5/5/2023  9:04 AM Jasmine Doss Add [Z30 483] Foot pain, right       ED Disposition     ED Disposition   Discharge    Condition   Stable    Date/Time   Fri May 5, 2023  9:04 AM    Comment   Evette Hernandez discharge to home/self care                 Follow-up Information     Follow up With Specialties Details Why Contact Info Additional [address] [facility] Call in 1 week If symptoms worsen [address] [phone]  [address] [facility], [address] [place], [place], [address]  Use Entrance A           Discharge Medication List as of 5/5/2023  9:05 AM      CONTINUE these medications which have NOT CHANGED    Details   ALPRAZolam (XANAX) 0 25 mg tablet Take 0 25 mg by mouth, Starting Wed 2/16/2022, Until Mon 8/15/2022 at 2359, Historical Med      cephalexin (KEFLEX) 500 mg capsule TAKE 1 CAPSULE BY MOUTH FOUR TIMES A DAY FOR 7 DAYS, Historical Med      diclofenac (VOLTAREN) 75 mg EC tablet Take 75 mg by mouth, Starting Wed 5/18/2022, Until Thu 5/18/2023 at 2359, Historical Med      famotidine (PEPCID) 40 MG tablet Take 40 mg by mouth daily, Starting Wed 1/19/2022, Until Thu 1/19/2023, Historical Med      fluticasone (FLONASE) 50 mcg/act nasal spray 1 spray into each nostril daily, Starting Mon 7/18/2022, Normal gabapentin (NEURONTIN) 300 mg capsule Take 300 mg by mouth, Starting Tue 7/5/2022, Historical Med      loperamide (IMODIUM) 2 mg capsule Take 1 capsule (2 mg total) by mouth 4 (four) times a day as needed for diarrhea, Starting Mon 7/18/2022, Normal      methocarbamol (ROBAXIN) 500 mg tablet TAKE 1 TABLET BY MOUTH 4 TIMES A DAY AS NEEDED FOR MUSCLE SPASMS , Historical Med      montelukast (SINGULAIR) 10 mg tablet TAKE 1 TABLET BY MOUTH EVERY DAY AT NIGHT, Historical Med      naproxen (NAPROSYN) 500 mg tablet Take 1 tablet (500 mg total) by mouth 2 (two) times a day with meals, Starting Mon 7/18/2022, Normal      ranitidine (ZANTAC) 150 mg tablet Take 0 5 tablets (75 mg total) by mouth 2 (two) times a day as needed for heartburn for up to 30 days, Starting Mon 3/11/2019, Until Thu 6/17/2021 at 2359, Normal      sertraline (ZOLOFT) 25 mg tablet Take 25 mg by mouth daily, Starting Wed 5/25/2022, Historical Med      ZADITOR 0 025 % ophthalmic solution instill 1 drop into both eyes twice a day if needed for ITCHY EYES FOR UP TO 90 DAYS, Historical Med           No discharge procedures on file  PDMP Review       Value Time User    PDMP Reviewed  Yes 2/25/2020 10:44 AM Trish Robles PA-C           ED Provider  Attending physically available and evaluated Neo French  MARICRUZ managed the patient along with the ED Attending      Electronically Signed by         Sherita Galeazzi, MD  05/05/23 6062

## 2023-05-20 ENCOUNTER — APPOINTMENT (EMERGENCY)
Dept: RADIOLOGY | Facility: HOSPITAL | Age: 38
End: 2023-05-20

## 2023-05-20 ENCOUNTER — APPOINTMENT (EMERGENCY)
Dept: CT IMAGING | Facility: HOSPITAL | Age: 38
End: 2023-05-20

## 2023-05-20 ENCOUNTER — HOSPITAL ENCOUNTER (EMERGENCY)
Facility: HOSPITAL | Age: 38
Discharge: HOME | End: 2023-05-20
Attending: EMERGENCY MEDICINE

## 2023-05-20 VITALS
OXYGEN SATURATION: 97 % | HEART RATE: 120 BPM | WEIGHT: 177.69 LBS | BODY MASS INDEX: 29.34 KG/M2 | SYSTOLIC BLOOD PRESSURE: 130 MMHG | RESPIRATION RATE: 20 BRPM | TEMPERATURE: 98.3 F | DIASTOLIC BLOOD PRESSURE: 76 MMHG
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
## 2023-05-20 DIAGNOSIS — S09.90XA CLOSED HEAD INJURY, INITIAL ENCOUNTER: ICD-10-CM

## 2023-05-20 LAB
EXT PREGNANCY TEST URINE: NEGATIVE
EXT. CONTROL: NORMAL
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
## 2023-05-22 ENCOUNTER — APPOINTMENT (EMERGENCY)
Dept: RADIOLOGY | Facility: HOSPITAL | Age: 38
End: 2023-05-22

## 2023-05-22 ENCOUNTER — HOSPITAL ENCOUNTER (EMERGENCY)
Facility: HOSPITAL | Age: 38
Discharge: HOME | End: 2023-05-22
Attending: EMERGENCY MEDICINE

## 2023-05-22 VITALS
SYSTOLIC BLOOD PRESSURE: 115 MMHG | DIASTOLIC BLOOD PRESSURE: 60 MMHG | OXYGEN SATURATION: 94 % | HEART RATE: 93 BPM | TEMPERATURE: 98.4 F | RESPIRATION RATE: 18 BRPM

## 2023-05-22 DIAGNOSIS — G43.909 MIGRAINE: Primary | ICD-10-CM

## 2023-05-22 RX ORDER — MAGNESIUM SULFATE HEPTAHYDRATE 40 MG/ML
2 INJECTION, SOLUTION INTRAVENOUS ONCE
Status: COMPLETED | OUTPATIENT
Start: 2023-05-22 | End: 2023-05-22

## 2023-05-22 RX ORDER — METOCLOPRAMIDE HYDROCHLORIDE 5 MG/ML
10 INJECTION INTRAMUSCULAR; INTRAVENOUS ONCE
Status: COMPLETED | OUTPATIENT
Start: 2023-05-22 | End: 2023-05-22

## 2023-05-22 RX ORDER — KETOROLAC TROMETHAMINE 30 MG/ML
30 INJECTION, SOLUTION INTRAMUSCULAR; INTRAVENOUS ONCE
Status: COMPLETED | OUTPATIENT
Start: 2023-05-22 | End: 2023-05-22

## 2023-05-22 RX ORDER — DIPHENHYDRAMINE HYDROCHLORIDE 50 MG/ML
25 INJECTION INTRAMUSCULAR; INTRAVENOUS ONCE
Status: COMPLETED | OUTPATIENT
Start: 2023-05-22 | End: 2023-05-22

## 2023-05-22 RX ADMIN — MAGNESIUM SULFATE HEPTAHYDRATE 2 G: 40 INJECTION, SOLUTION INTRAVENOUS at 12:04

## 2023-05-22 RX ADMIN — KETOROLAC TROMETHAMINE 30 MG: 30 INJECTION, SOLUTION INTRAMUSCULAR; INTRAVENOUS at 12:04

## 2023-05-22 RX ADMIN — DIPHENHYDRAMINE HYDROCHLORIDE 25 MG: 50 INJECTION, SOLUTION INTRAMUSCULAR; INTRAVENOUS at 12:04

## 2023-05-22 RX ADMIN — METOCLOPRAMIDE HYDROCHLORIDE 10 MG: 5 INJECTION INTRAMUSCULAR; INTRAVENOUS at 12:04
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
## 2023-05-24 NOTE — ED ATTENDING ATTESTATION
I was the attending physician on duty at the time the patient visited the emergency department  The patient was evaluated by the Advanced Practitioner  I was personally available for consultation; however the patient’s care, medical decisions and disposition fell within the Advanced Practitioner’s scope of practice to independently manage the patient, unless otherwise noted      Gardenia Martinez MD

## 2023-06-02 ENCOUNTER — APPOINTMENT (OUTPATIENT)
Dept: LAB | Facility: CLINIC | Age: 38
End: 2023-06-02
Payer: COMMERCIAL

## 2023-06-02 ENCOUNTER — TRANSCRIBE ORDERS (OUTPATIENT)
Dept: LAB | Facility: CLINIC | Age: 38
End: 2023-06-02

## 2023-06-02 DIAGNOSIS — Z79.899 ENCOUNTER FOR LONG-TERM (CURRENT) USE OF OTHER MEDICATIONS: Primary | ICD-10-CM

## 2023-06-02 DIAGNOSIS — Z79.899 ENCOUNTER FOR LONG-TERM (CURRENT) USE OF OTHER MEDICATIONS: ICD-10-CM

## 2023-06-02 LAB
ALBUMIN SERPL BCP-MCNC: 3.4 G/DL (ref 3.5–5)
ALP SERPL-CCNC: 68 U/L (ref 46–116)
ALT SERPL W P-5'-P-CCNC: 23 U/L (ref 12–78)
ANION GAP SERPL CALCULATED.3IONS-SCNC: 5 MMOL/L (ref 4–13)
AST SERPL W P-5'-P-CCNC: 27 U/L (ref 5–45)
B-HCG SERPL-ACNC: <1 MIU/ML (ref 0–5)
BASOPHILS # BLD AUTO: 0.07 THOUSANDS/ÂΜL (ref 0–0.1)
BASOPHILS NFR BLD AUTO: 1 % (ref 0–1)
BILIRUB SERPL-MCNC: 0.84 MG/DL (ref 0.2–1)
BUN SERPL-MCNC: 6 MG/DL (ref 5–25)
CALCIUM ALBUM COR SERPL-MCNC: 9.3 MG/DL (ref 8.3–10.1)
CALCIUM SERPL-MCNC: 8.8 MG/DL (ref 8.3–10.1)
CHLORIDE SERPL-SCNC: 106 MMOL/L (ref 96–108)
CO2 SERPL-SCNC: 23 MMOL/L (ref 21–32)
CREAT SERPL-MCNC: 0.78 MG/DL (ref 0.6–1.3)
EOSINOPHIL # BLD AUTO: 0.02 THOUSAND/ÂΜL (ref 0–0.61)
EOSINOPHIL NFR BLD AUTO: 0 % (ref 0–6)
ERYTHROCYTE [DISTWIDTH] IN BLOOD BY AUTOMATED COUNT: 13.2 % (ref 11.6–15.1)
GFR SERPL CREATININE-BSD FRML MDRD: 98 ML/MIN/1.73SQ M
GLUCOSE P FAST SERPL-MCNC: 87 MG/DL (ref 65–99)
HCT VFR BLD AUTO: 38.1 % (ref 34.8–46.1)
HGB BLD-MCNC: 12.9 G/DL (ref 11.5–15.4)
IMM GRANULOCYTES # BLD AUTO: 0.02 THOUSAND/UL (ref 0–0.2)
IMM GRANULOCYTES NFR BLD AUTO: 0 % (ref 0–2)
LYMPHOCYTES # BLD AUTO: 2.49 THOUSANDS/ÂΜL (ref 0.6–4.47)
LYMPHOCYTES NFR BLD AUTO: 38 % (ref 14–44)
MCH RBC QN AUTO: 33.2 PG (ref 26.8–34.3)
MCHC RBC AUTO-ENTMCNC: 33.9 G/DL (ref 31.4–37.4)
MCV RBC AUTO: 98 FL (ref 82–98)
MONOCYTES # BLD AUTO: 0.58 THOUSAND/ÂΜL (ref 0.17–1.22)
MONOCYTES NFR BLD AUTO: 9 % (ref 4–12)
NEUTROPHILS # BLD AUTO: 3.37 THOUSANDS/ÂΜL (ref 1.85–7.62)
NEUTS SEG NFR BLD AUTO: 52 % (ref 43–75)
NRBC BLD AUTO-RTO: 0 /100 WBCS
PLATELET # BLD AUTO: 384 THOUSANDS/UL (ref 149–390)
PMV BLD AUTO: 9.1 FL (ref 8.9–12.7)
POTASSIUM SERPL-SCNC: 3.5 MMOL/L (ref 3.5–5.3)
PROT SERPL-MCNC: 7.3 G/DL (ref 6.4–8.4)
RBC # BLD AUTO: 3.88 MILLION/UL (ref 3.81–5.12)
SODIUM SERPL-SCNC: 134 MMOL/L (ref 135–147)
TSH SERPL DL<=0.05 MIU/L-ACNC: 1.76 UIU/ML (ref 0.45–4.5)
WBC # BLD AUTO: 6.55 THOUSAND/UL (ref 4.31–10.16)

## 2023-06-02 PROCEDURE — 36415 COLL VENOUS BLD VENIPUNCTURE: CPT

## 2023-06-02 PROCEDURE — 84702 CHORIONIC GONADOTROPIN TEST: CPT

## 2023-06-02 PROCEDURE — 85025 COMPLETE CBC W/AUTO DIFF WBC: CPT

## 2023-06-02 PROCEDURE — 80053 COMPREHEN METABOLIC PANEL: CPT

## 2023-06-02 PROCEDURE — 84443 ASSAY THYROID STIM HORMONE: CPT

## 2023-06-15 ENCOUNTER — APPOINTMENT (OUTPATIENT)
Dept: LAB | Facility: CLINIC | Age: 38
End: 2023-06-15
Payer: COMMERCIAL

## 2023-06-16 LAB
AMPHETAMINES UR QL SCN: NEGATIVE NG/ML
BARBITURATES UR QL SCN: NEGATIVE NG/ML
BENZODIAZ UR QL: NEGATIVE NG/ML
BZE UR QL: NEGATIVE NG/ML
CANNABINOIDS UR QL SCN: NEGATIVE NG/ML
METHADONE UR QL SCN: NEGATIVE NG/ML
OPIATES UR QL: NEGATIVE NG/ML
OXYCODONE+OXYMORPHONE UR QL SCN: NEGATIVE NG/ML
PCP UR QL: NEGATIVE NG/ML
PROPOXYPH UR QL SCN: NEGATIVE NG/ML

## 2024-03-28 ENCOUNTER — TRANSCRIBE ORDERS (OUTPATIENT)
Dept: LAB | Facility: CLINIC | Age: 39
End: 2024-03-28

## 2024-03-28 ENCOUNTER — APPOINTMENT (OUTPATIENT)
Dept: LAB | Facility: CLINIC | Age: 39
End: 2024-03-28
Payer: COMMERCIAL

## 2024-03-28 DIAGNOSIS — E66.9 CLASS 1 OBESITY WITH SERIOUS COMORBIDITY AND BODY MASS INDEX (BMI) OF 30.0 TO 30.9 IN ADULT, UNSPECIFIED OBESITY TYPE: ICD-10-CM

## 2024-03-28 DIAGNOSIS — E55.9 MILD VITAMIN D DEFICIENCY: Primary | ICD-10-CM

## 2024-03-28 DIAGNOSIS — E55.9 MILD VITAMIN D DEFICIENCY: ICD-10-CM

## 2024-03-28 DIAGNOSIS — Z00.00 ROUTINE GENERAL MEDICAL EXAMINATION AT A HEALTH CARE FACILITY: ICD-10-CM

## 2024-03-28 LAB
25(OH)D3 SERPL-MCNC: 11.5 NG/ML (ref 30–100)
ALBUMIN SERPL BCP-MCNC: 3.8 G/DL (ref 3.5–5)
ALP SERPL-CCNC: 65 U/L (ref 34–104)
ALT SERPL W P-5'-P-CCNC: 9 U/L (ref 7–52)
ANION GAP SERPL CALCULATED.3IONS-SCNC: 7 MMOL/L (ref 4–13)
AST SERPL W P-5'-P-CCNC: 15 U/L (ref 13–39)
BASOPHILS # BLD AUTO: 0.03 THOUSANDS/ÂΜL (ref 0–0.1)
BASOPHILS NFR BLD AUTO: 1 % (ref 0–1)
BILIRUB SERPL-MCNC: 0.71 MG/DL (ref 0.2–1)
BUN SERPL-MCNC: 11 MG/DL (ref 5–25)
CALCIUM SERPL-MCNC: 8.5 MG/DL (ref 8.4–10.2)
CHLORIDE SERPL-SCNC: 107 MMOL/L (ref 96–108)
CHOLEST SERPL-MCNC: 172 MG/DL
CO2 SERPL-SCNC: 21 MMOL/L (ref 21–32)
CREAT SERPL-MCNC: 0.71 MG/DL (ref 0.6–1.3)
EOSINOPHIL # BLD AUTO: 0.03 THOUSAND/ÂΜL (ref 0–0.61)
EOSINOPHIL NFR BLD AUTO: 1 % (ref 0–6)
ERYTHROCYTE [DISTWIDTH] IN BLOOD BY AUTOMATED COUNT: 12.5 % (ref 11.6–15.1)
EST. AVERAGE GLUCOSE BLD GHB EST-MCNC: 97 MG/DL
GFR SERPL CREATININE-BSD FRML MDRD: 109 ML/MIN/1.73SQ M
GLUCOSE P FAST SERPL-MCNC: 84 MG/DL (ref 65–99)
HBA1C MFR BLD: 5 %
HCT VFR BLD AUTO: 40.1 % (ref 34.8–46.1)
HDLC SERPL-MCNC: 53 MG/DL
HGB BLD-MCNC: 13.1 G/DL (ref 11.5–15.4)
IMM GRANULOCYTES # BLD AUTO: 0.03 THOUSAND/UL (ref 0–0.2)
IMM GRANULOCYTES NFR BLD AUTO: 1 % (ref 0–2)
LDLC SERPL CALC-MCNC: 98 MG/DL (ref 0–100)
LYMPHOCYTES # BLD AUTO: 2.41 THOUSANDS/ÂΜL (ref 0.6–4.47)
LYMPHOCYTES NFR BLD AUTO: 39 % (ref 14–44)
MCH RBC QN AUTO: 32.4 PG (ref 26.8–34.3)
MCHC RBC AUTO-ENTMCNC: 32.7 G/DL (ref 31.4–37.4)
MCV RBC AUTO: 99 FL (ref 82–98)
MONOCYTES # BLD AUTO: 0.53 THOUSAND/ÂΜL (ref 0.17–1.22)
MONOCYTES NFR BLD AUTO: 9 % (ref 4–12)
NEUTROPHILS # BLD AUTO: 3.13 THOUSANDS/ÂΜL (ref 1.85–7.62)
NEUTS SEG NFR BLD AUTO: 49 % (ref 43–75)
NONHDLC SERPL-MCNC: 119 MG/DL
NRBC BLD AUTO-RTO: 0 /100 WBCS
PLATELET # BLD AUTO: 407 THOUSANDS/UL (ref 149–390)
PMV BLD AUTO: 9.2 FL (ref 8.9–12.7)
POTASSIUM SERPL-SCNC: 4.1 MMOL/L (ref 3.5–5.3)
PROT SERPL-MCNC: 6.5 G/DL (ref 6.4–8.4)
RBC # BLD AUTO: 4.04 MILLION/UL (ref 3.81–5.12)
SODIUM SERPL-SCNC: 135 MMOL/L (ref 135–147)
TRIGL SERPL-MCNC: 103 MG/DL
WBC # BLD AUTO: 6.16 THOUSAND/UL (ref 4.31–10.16)

## 2024-03-28 PROCEDURE — 36415 COLL VENOUS BLD VENIPUNCTURE: CPT

## 2024-03-28 PROCEDURE — 85025 COMPLETE CBC W/AUTO DIFF WBC: CPT

## 2024-03-28 PROCEDURE — 80061 LIPID PANEL: CPT

## 2024-03-28 PROCEDURE — 82306 VITAMIN D 25 HYDROXY: CPT

## 2024-03-28 PROCEDURE — 80053 COMPREHEN METABOLIC PANEL: CPT

## 2024-03-28 PROCEDURE — 83036 HEMOGLOBIN GLYCOSYLATED A1C: CPT

## 2024-12-23 ENCOUNTER — OFFICE VISIT (OUTPATIENT)
Dept: URGENT CARE | Age: 39
End: 2024-12-23
Payer: COMMERCIAL

## 2024-12-23 VITALS
SYSTOLIC BLOOD PRESSURE: 100 MMHG | DIASTOLIC BLOOD PRESSURE: 67 MMHG | HEIGHT: 60 IN | WEIGHT: 160 LBS | BODY MASS INDEX: 31.41 KG/M2 | RESPIRATION RATE: 20 BRPM | TEMPERATURE: 97.6 F | HEART RATE: 73 BPM | OXYGEN SATURATION: 98 %

## 2024-12-23 DIAGNOSIS — J20.9 ACUTE BRONCHITIS, UNSPECIFIED ORGANISM: Primary | ICD-10-CM

## 2024-12-23 PROCEDURE — 99213 OFFICE O/P EST LOW 20 MIN: CPT

## 2024-12-23 RX ORDER — BROMPHENIRAMINE MALEATE, PSEUDOEPHEDRINE HYDROCHLORIDE, AND DEXTROMETHORPHAN HYDROBROMIDE 2; 30; 10 MG/5ML; MG/5ML; MG/5ML
5 SYRUP ORAL 4 TIMES DAILY PRN
Qty: 120 ML | Refills: 0 | Status: SHIPPED | OUTPATIENT
Start: 2024-12-23

## 2024-12-23 RX ORDER — ALBUTEROL SULFATE 90 UG/1
2 INHALANT RESPIRATORY (INHALATION) EVERY 6 HOURS PRN
Qty: 8.5 G | Refills: 0 | Status: SHIPPED | OUTPATIENT
Start: 2024-12-23

## 2024-12-23 RX ORDER — TIRZEPATIDE 5 MG/.5ML
INJECTION, SOLUTION SUBCUTANEOUS
COMMUNITY

## 2024-12-23 RX ORDER — AZITHROMYCIN 250 MG/1
TABLET, FILM COATED ORAL
Qty: 6 TABLET | Refills: 0 | Status: SHIPPED | OUTPATIENT
Start: 2024-12-23 | End: 2024-12-27

## 2024-12-23 NOTE — PATIENT INSTRUCTIONS
Take antibiotic- Zithromax as directed.  Recommend daily probiotic while on antibiotic or eat yogurt with live cultures daily while on antibiotic.     You may take Tylenol or Motrin for pain and fever.  Albuterol inhaler for wheezing and shortness of breath.  Bromphed for cough and congestion..  Flonase- one spray each nostril daily for nasal congestions.    Rest, increase fluids, good hand washing.  Please follow up with your family doctor if no improvement in 7-10 days.

## 2024-12-23 NOTE — PROGRESS NOTES
[facility]        NAME: Gómez Kim is a 39 y.o. female  : 1985    MRN: [id number]  DATE: 2024  TIME: 8:42 AM    Assessment and Plan   Acute bronchitis, unspecified organism [J20.9]  1. Acute bronchitis, unspecified organism  azithromycin (ZITHROMAX) 250 mg tablet    albuterol (ProAir HFA) 90 mcg/act inhaler    brompheniramine-pseudoephedrine-DM 30-2-10 MG/5ML syrup        Take antibiotic- Zithromax as directed.  Recommend daily probiotic while on antibiotic or eat yogurt with live cultures daily while on antibiotic.     You may take Tylenol or Motrin for pain and fever.  Albuterol inhaler for wheezing and shortness of breath.  Bromphed for cough and congestion..  Flonase- one spray each nostril daily for nasal congestions.    Rest, increase fluids, good hand washing.  Please follow up with your family doctor if no improvement in 7-10 days.     Patient Instructions       Follow up with PCP in 3-5 days.  Proceed to  ER if symptoms worsen.    If tests have been performed at [facility], our office will contact you with results if changes need to be made to the care plan discussed with you at the visit.  [facility].    Chief Complaint     Chief Complaint   Patient presents with   • Cold Like Symptoms     Started last Tuesday... got worse over the weekend.    • Cough   • Headache         History of Present Illness       Pt is a 39 year old female presenting with one week of productive cough, congestion, runny nose, and fatigue.  She denies fever or chills, chest pain, or difficulty breathing.  She reports congestions makes her SOB at times.  She reports multiple sick contacts.  She has taken OTC cough medicine without relief.     Cough  Associated symptoms include chills, headaches and rhinorrhea. Pertinent negatives include no chest pain, fever, shortness of breath or wheezing.   Headache      Review of Systems   Review of Systems    Constitutional:  Positive for chills and fatigue. Negative for fever.   HENT:  Positive for congestion, rhinorrhea and sinus pain.    Respiratory:  Positive for cough. Negative for apnea, choking, chest tightness, shortness of breath, wheezing and stridor.    Cardiovascular:  Negative for chest pain.   Gastrointestinal:  Negative for abdominal pain and nausea.   Neurological:  Positive for headaches.         Current Medications       Current Outpatient Medications:   •  albuterol (ProAir HFA) 90 mcg/act inhaler, Inhale 2 puffs every 6 (six) hours as needed for shortness of breath or wheezing, Disp: 8.5 g, Rfl: 0  •  azithromycin (ZITHROMAX) 250 mg tablet, Take 2 tablets today then 1 tablet daily x 4 days, Disp: 6 tablet, Rfl: 0  •  brompheniramine-pseudoephedrine-DM 30-2-10 MG/5ML syrup, Take 5 mL by mouth 4 (four) times a day as needed for allergies, Disp: 120 mL, Rfl: 0  •  gabapentin (NEURONTIN) 300 mg capsule, Take 300 mg by mouth, Disp: , Rfl:   •  ranitidine (ZANTAC) 150 mg tablet, Take 0.5 tablets (75 mg total) by mouth 2 (two) times a day as needed for heartburn for up to 30 days, Disp: 60 tablet, Rfl: 0  •  sertraline (ZOLOFT) 25 mg tablet, Take 25 mg by mouth daily, Disp: , Rfl:   •  Tirzepatide-Weight Management (Zepbound) 5 MG/0.5ML SOLN, Inject under the skin, Disp: , Rfl:   •  ALPRAZolam (XANAX) 0.25 mg tablet, Take 0.25 mg by mouth, Disp: , Rfl:   •  cephalexin (KEFLEX) 500 mg capsule, TAKE 1 CAPSULE BY MOUTH FOUR TIMES A DAY FOR 7 DAYS (Patient not taking: Reported on 12/23/2024), Disp: , Rfl:   •  diclofenac (VOLTAREN) 75 mg EC tablet, Take 75 mg by mouth, Disp: , Rfl:   •  famotidine (PEPCID) 40 MG tablet, Take 40 mg by mouth daily, Disp: , Rfl:   •  fluticasone (FLONASE) 50 mcg/act nasal spray, 1 spray into each nostril daily (Patient not taking: Reported on 12/23/2024), Disp: 16 g, Rfl: 0  •  loperamide (IMODIUM) 2 mg capsule, Take 1 capsule (2 mg total) by mouth 4 (four) times a day as needed  for diarrhea (Patient not taking: Reported on 2024), Disp: 12 capsule, Rfl: 0  •  methocarbamol (ROBAXIN) 500 mg tablet, TAKE 1 TABLET BY MOUTH 4 TIMES A DAY AS NEEDED FOR MUSCLE SPASMS. (Patient not taking: Reported on 2024), Disp: , Rfl:   •  montelukast (SINGULAIR) 10 mg tablet, TAKE 1 TABLET BY MOUTH EVERY DAY AT NIGHT (Patient not taking: Reported on 2024), Disp: , Rfl:   •  naproxen (NAPROSYN) 500 mg tablet, Take 1 tablet (500 mg total) by mouth 2 (two) times a day with meals (Patient not taking: Reported on 2024), Disp: 20 tablet, Rfl: 0  •  ZADITOR 0.025 % ophthalmic solution, instill 1 drop into both eyes twice a day if needed for ITCHY EYES FOR UP TO 90 DAYS (Patient not taking: Reported on 2024), Disp: , Rfl:     Current Allergies     Allergies as of 2024 - Reviewed 2024   Allergen Reaction Noted   • Dust mite extract  2014   • Pollen extract  2014   • Short ragweed pollen ext  2014            The following portions of the patient's history were reviewed and updated as appropriate: allergies, current medications, past family history, past medical history, past social history, past surgical history and problem list.     Past Medical History:   Diagnosis Date   • Asthma    • GERD (gastroesophageal reflux disease)     last assessed: 2014   • Migraine    • Pregnancy induced hypertension        Past Surgical History:   Procedure Laterality Date   •  SECTION      x 3   • INDUCED      • TUBAL LIGATION         Family History   Problem Relation Age of Onset   • Asthma Mother    • Migraines Mother    • Osteoarthritis Mother    • Cervical cancer Mother    • Hypertension Mother    • Multiple sclerosis Mother 60   • Arthritis Father    • Diabetes Father    • No Known Problems Sister    • No Known Problems Brother    • Asthma Daughter    • Asthma Son    • Diabetes Maternal Grandmother    • Heart failure Maternal Grandmother    • Arthritis  Maternal Grandmother          Medications have been verified.        Objective   /67   Pulse 73   Temp 97.6 °F (36.4 °C)   Resp 20   Ht 5' (1.524 m)   Wt 72.6 kg (160 lb)   LMP 11/20/2024 (Approximate)   SpO2 98%   BMI 31.25 kg/m²   Patient's last menstrual period was 11/20/2024 (approximate).       Physical Exam     Physical Exam  Vitals and nursing note reviewed.   Constitutional:       General: She is not in acute distress.     Appearance: Normal appearance. She is normal weight.   HENT:      Head: Normocephalic and atraumatic.      Right Ear: Tympanic membrane normal.      Left Ear: Tympanic membrane normal.      Nose: Congestion present.      Mouth/Throat:      Mouth: Mucous membranes are moist.      Pharynx: Posterior oropharyngeal erythema present.   Eyes:      Extraocular Movements: Extraocular movements intact.   Cardiovascular:      Rate and Rhythm: Normal rate and regular rhythm.      Pulses: Normal pulses.      Heart sounds: Normal heart sounds.   Pulmonary:      Effort: Pulmonary effort is normal. No respiratory distress.      Breath sounds: Normal breath sounds. No stridor. No wheezing, rhonchi or rales.   Chest:      Chest wall: No tenderness.   Abdominal:      General: Abdomen is flat.      Palpations: Abdomen is soft.   Skin:     General: Skin is warm.      Capillary Refill: Capillary refill takes less than 2 seconds.   Neurological:      Mental Status: She is alert.

## 2024-12-23 NOTE — LETTER
December 23, 2024     Patient: Gómez Kim   YOB: 1985   Date of Visit: 12/23/2024       To Whom it May Concern:    Gómez Kim was seen in my clinic on 12/23/2024. She may return to work on 12/25/2024 .    If you have any questions or concerns, please don't hesitate to call.         Sincerely,          JEFFREY Goodman        CC: No Recipients

## 2025-01-13 ENCOUNTER — APPOINTMENT (OUTPATIENT)
Dept: LAB | Facility: CLINIC | Age: 40
End: 2025-01-13
Payer: COMMERCIAL

## 2025-01-13 DIAGNOSIS — F41.9 ANXIETY: ICD-10-CM

## 2025-01-13 LAB
TSH SERPL DL<=0.05 MIU/L-ACNC: 2.34 UIU/ML (ref 0.45–4.5)
VIT B12 SERPL-MCNC: 431 PG/ML (ref 180–914)

## 2025-01-13 PROCEDURE — 36415 COLL VENOUS BLD VENIPUNCTURE: CPT

## 2025-01-13 PROCEDURE — 82607 VITAMIN B-12: CPT

## 2025-01-13 PROCEDURE — 84443 ASSAY THYROID STIM HORMONE: CPT

## 2025-01-29 DIAGNOSIS — J20.9 ACUTE BRONCHITIS, UNSPECIFIED ORGANISM: ICD-10-CM

## 2025-01-30 RX ORDER — ALBUTEROL SULFATE 90 UG/1
2 INHALANT RESPIRATORY (INHALATION) EVERY 6 HOURS PRN
OUTPATIENT
Start: 2025-01-30

## 2025-02-07 ENCOUNTER — APPOINTMENT (OUTPATIENT)
Dept: LAB | Facility: CLINIC | Age: 40
End: 2025-02-07
Payer: COMMERCIAL

## 2025-02-07 DIAGNOSIS — K21.9 GASTROESOPHAGEAL REFLUX DISEASE, UNSPECIFIED WHETHER ESOPHAGITIS PRESENT: ICD-10-CM

## 2025-02-07 DIAGNOSIS — Z90.3 H/O GASTRIC SLEEVE: ICD-10-CM

## 2025-02-07 DIAGNOSIS — K44.9 HIATAL HERNIA: ICD-10-CM

## 2025-02-07 DIAGNOSIS — Z90.3 HISTORY OF SLEEVE GASTRECTOMY: ICD-10-CM

## 2025-02-07 DIAGNOSIS — F41.9 ANXIETY: ICD-10-CM

## 2025-02-07 DIAGNOSIS — E66.811 OBESITY (BMI 30.0-34.9): ICD-10-CM

## 2025-02-07 LAB
ANION GAP SERPL CALCULATED.3IONS-SCNC: 10 MMOL/L (ref 4–13)
BASOPHILS # BLD AUTO: 0.05 THOUSANDS/ΜL (ref 0–0.1)
BASOPHILS NFR BLD AUTO: 1 % (ref 0–1)
BUN SERPL-MCNC: 7 MG/DL (ref 5–25)
CALCIUM SERPL-MCNC: 8.9 MG/DL (ref 8.4–10.2)
CHLORIDE SERPL-SCNC: 105 MMOL/L (ref 96–108)
CO2 SERPL-SCNC: 25 MMOL/L (ref 21–32)
CREAT SERPL-MCNC: 0.67 MG/DL (ref 0.6–1.3)
EOSINOPHIL # BLD AUTO: 0.04 THOUSAND/ΜL (ref 0–0.61)
EOSINOPHIL NFR BLD AUTO: 1 % (ref 0–6)
ERYTHROCYTE [DISTWIDTH] IN BLOOD BY AUTOMATED COUNT: 13.4 % (ref 11.6–15.1)
GFR SERPL CREATININE-BSD FRML MDRD: 111 ML/MIN/1.73SQ M
GLUCOSE P FAST SERPL-MCNC: 70 MG/DL (ref 65–99)
HCT VFR BLD AUTO: 41.1 % (ref 34.8–46.1)
HGB BLD-MCNC: 13.3 G/DL (ref 11.5–15.4)
IMM GRANULOCYTES # BLD AUTO: 0.01 THOUSAND/UL (ref 0–0.2)
IMM GRANULOCYTES NFR BLD AUTO: 0 % (ref 0–2)
LYMPHOCYTES # BLD AUTO: 2.46 THOUSANDS/ΜL (ref 0.6–4.47)
LYMPHOCYTES NFR BLD AUTO: 36 % (ref 14–44)
MCH RBC QN AUTO: 32.4 PG (ref 26.8–34.3)
MCHC RBC AUTO-ENTMCNC: 32.4 G/DL (ref 31.4–37.4)
MCV RBC AUTO: 100 FL (ref 82–98)
MONOCYTES # BLD AUTO: 0.45 THOUSAND/ΜL (ref 0.17–1.22)
MONOCYTES NFR BLD AUTO: 7 % (ref 4–12)
NEUTROPHILS # BLD AUTO: 3.83 THOUSANDS/ΜL (ref 1.85–7.62)
NEUTS SEG NFR BLD AUTO: 55 % (ref 43–75)
NRBC BLD AUTO-RTO: 0 /100 WBCS
PLATELET # BLD AUTO: 383 THOUSANDS/UL (ref 149–390)
PMV BLD AUTO: 9.3 FL (ref 8.9–12.7)
POTASSIUM SERPL-SCNC: 4.1 MMOL/L (ref 3.5–5.3)
RBC # BLD AUTO: 4.11 MILLION/UL (ref 3.81–5.12)
SODIUM SERPL-SCNC: 140 MMOL/L (ref 135–147)
WBC # BLD AUTO: 6.84 THOUSAND/UL (ref 4.31–10.16)

## 2025-02-07 PROCEDURE — 80048 BASIC METABOLIC PNL TOTAL CA: CPT

## 2025-02-07 PROCEDURE — 36415 COLL VENOUS BLD VENIPUNCTURE: CPT

## 2025-02-07 PROCEDURE — 85025 COMPLETE CBC W/AUTO DIFF WBC: CPT

## 2025-05-18 ENCOUNTER — HOSPITAL ENCOUNTER (EMERGENCY)
Facility: HOSPITAL | Age: 40
Discharge: HOME | End: 2025-05-18
Attending: EMERGENCY MEDICINE
Payer: COMMERCIAL

## 2025-05-18 ENCOUNTER — APPOINTMENT (EMERGENCY)
Dept: RADIOLOGY | Facility: HOSPITAL | Age: 40
End: 2025-05-18
Payer: COMMERCIAL

## 2025-05-18 VITALS
HEART RATE: 58 BPM | SYSTOLIC BLOOD PRESSURE: 126 MMHG | RESPIRATION RATE: 17 BRPM | DIASTOLIC BLOOD PRESSURE: 70 MMHG | OXYGEN SATURATION: 98 % | TEMPERATURE: 98 F

## 2025-05-18 DIAGNOSIS — R11.0 NAUSEA: ICD-10-CM

## 2025-05-18 DIAGNOSIS — R07.9 CHEST PAIN: Primary | ICD-10-CM

## 2025-05-18 DIAGNOSIS — R10.9 ABDOMINAL PAIN: ICD-10-CM

## 2025-05-18 LAB
ALBUMIN SERPL BCG-MCNC: 3.7 G/DL (ref 3.5–5)
ALP SERPL-CCNC: 53 U/L (ref 34–104)
ALT SERPL W P-5'-P-CCNC: 16 U/L (ref 7–52)
ANION GAP SERPL CALCULATED.3IONS-SCNC: 6 MMOL/L (ref 4–13)
AST SERPL W P-5'-P-CCNC: 26 U/L (ref 13–39)
ATRIAL RATE: 81 BPM
BASOPHILS # BLD AUTO: 0.05 THOUSANDS/ÂΜL (ref 0–0.1)
BASOPHILS NFR BLD AUTO: 1 % (ref 0–1)
BILIRUB SERPL-MCNC: 0.33 MG/DL (ref 0.2–1)
BUN SERPL-MCNC: 10 MG/DL (ref 5–25)
CALCIUM SERPL-MCNC: 8.7 MG/DL (ref 8.4–10.2)
CARDIAC TROPONIN I PNL SERPL HS: <2 NG/L (ref ?–50)
CHLORIDE SERPL-SCNC: 106 MMOL/L (ref 96–108)
CO2 SERPL-SCNC: 25 MMOL/L (ref 21–32)
CREAT SERPL-MCNC: 0.68 MG/DL (ref 0.6–1.3)
EOSINOPHIL # BLD AUTO: 0.07 THOUSAND/ÂΜL (ref 0–0.61)
EOSINOPHIL NFR BLD AUTO: 1 % (ref 0–6)
ERYTHROCYTE [DISTWIDTH] IN BLOOD BY AUTOMATED COUNT: 14 % (ref 11.6–15.1)
GFR SERPL CREATININE-BSD FRML MDRD: 111 ML/MIN/1.73SQ M
GLUCOSE SERPL-MCNC: 73 MG/DL (ref 65–140)
HCG SERPL QL: NEGATIVE
HCT VFR BLD AUTO: 38.3 % (ref 34.8–46.1)
HGB BLD-MCNC: 12.6 G/DL (ref 11.5–15.4)
IMM GRANULOCYTES # BLD AUTO: 0.04 THOUSAND/UL (ref 0–0.2)
IMM GRANULOCYTES NFR BLD AUTO: 0 % (ref 0–2)
LIPASE SERPL-CCNC: 35 U/L (ref 11–82)
LYMPHOCYTES # BLD AUTO: 2.81 THOUSANDS/ÂΜL (ref 0.6–4.47)
LYMPHOCYTES NFR BLD AUTO: 28 % (ref 14–44)
MCH RBC QN AUTO: 32.9 PG (ref 26.8–34.3)
MCHC RBC AUTO-ENTMCNC: 32.9 G/DL (ref 31.4–37.4)
MCV RBC AUTO: 100 FL (ref 82–98)
MONOCYTES # BLD AUTO: 0.6 THOUSAND/ÂΜL (ref 0.17–1.22)
MONOCYTES NFR BLD AUTO: 6 % (ref 4–12)
NEUTROPHILS # BLD AUTO: 6.55 THOUSANDS/ÂΜL (ref 1.85–7.62)
NEUTS SEG NFR BLD AUTO: 64 % (ref 43–75)
NRBC BLD AUTO-RTO: 0 /100 WBCS
P AXIS: -16 DEGREES
PLATELET # BLD AUTO: 301 THOUSANDS/UL (ref 149–390)
PMV BLD AUTO: 9 FL (ref 8.9–12.7)
POTASSIUM SERPL-SCNC: 4.3 MMOL/L (ref 3.5–5.3)
PR INTERVAL: 138 MS
PROT SERPL-MCNC: 6.7 G/DL (ref 6.4–8.4)
QRS AXIS: 61 DEGREES
QRSD INTERVAL: 82 MS
QT INTERVAL: 376 MS
QTC INTERVAL: 437 MS
RBC # BLD AUTO: 3.83 MILLION/UL (ref 3.81–5.12)
SODIUM SERPL-SCNC: 137 MMOL/L (ref 135–147)
T WAVE AXIS: 23 DEGREES
VENTRICULAR RATE: 81 BPM
WBC # BLD AUTO: 10.12 THOUSAND/UL (ref 4.31–10.16)

## 2025-05-18 PROCEDURE — 36415 COLL VENOUS BLD VENIPUNCTURE: CPT

## 2025-05-18 PROCEDURE — 96375 TX/PRO/DX INJ NEW DRUG ADDON: CPT

## 2025-05-18 PROCEDURE — 74177 CT ABD & PELVIS W/CONTRAST: CPT

## 2025-05-18 PROCEDURE — 71046 X-RAY EXAM CHEST 2 VIEWS: CPT

## 2025-05-18 PROCEDURE — 84703 CHORIONIC GONADOTROPIN ASSAY: CPT

## 2025-05-18 PROCEDURE — 99285 EMERGENCY DEPT VISIT HI MDM: CPT | Performed by: EMERGENCY MEDICINE

## 2025-05-18 PROCEDURE — 96374 THER/PROPH/DIAG INJ IV PUSH: CPT

## 2025-05-18 PROCEDURE — 93010 ELECTROCARDIOGRAM REPORT: CPT | Performed by: INTERNAL MEDICINE

## 2025-05-18 PROCEDURE — 84484 ASSAY OF TROPONIN QUANT: CPT

## 2025-05-18 PROCEDURE — 80053 COMPREHEN METABOLIC PANEL: CPT

## 2025-05-18 PROCEDURE — 93005 ELECTROCARDIOGRAM TRACING: CPT

## 2025-05-18 PROCEDURE — 99285 EMERGENCY DEPT VISIT HI MDM: CPT

## 2025-05-18 PROCEDURE — 83690 ASSAY OF LIPASE: CPT

## 2025-05-18 PROCEDURE — 85025 COMPLETE CBC W/AUTO DIFF WBC: CPT

## 2025-05-18 RX ORDER — ONDANSETRON 2 MG/ML
4 INJECTION INTRAMUSCULAR; INTRAVENOUS ONCE
Status: COMPLETED | OUTPATIENT
Start: 2025-05-18 | End: 2025-05-18

## 2025-05-18 RX ORDER — MORPHINE SULFATE 4 MG/ML
4 INJECTION, SOLUTION INTRAMUSCULAR; INTRAVENOUS ONCE
Status: COMPLETED | OUTPATIENT
Start: 2025-05-18 | End: 2025-05-18

## 2025-05-18 RX ORDER — ONDANSETRON 4 MG/1
4 TABLET, ORALLY DISINTEGRATING ORAL EVERY 6 HOURS PRN
Qty: 30 TABLET | Refills: 0 | Status: SHIPPED | OUTPATIENT
Start: 2025-05-18

## 2025-05-18 RX ADMIN — IOHEXOL 75 ML: 350 INJECTION, SOLUTION INTRAVENOUS at 13:37

## 2025-05-18 RX ADMIN — ONDANSETRON 4 MG: 2 INJECTION INTRAMUSCULAR; INTRAVENOUS at 10:50

## 2025-05-18 RX ADMIN — IOHEXOL 50 ML: 240 INJECTION, SOLUTION INTRATHECAL; INTRAVASCULAR; INTRAVENOUS; ORAL at 11:53

## 2025-05-18 RX ADMIN — MORPHINE SULFATE 4 MG: 4 INJECTION INTRAVENOUS at 10:50

## 2025-05-18 NOTE — Clinical Note
Gómez Kim was seen and treated in our emergency department on 5/18/2025.    No restrictions            Diagnosis:     Gómez  may return to work on return date.    She may return on this date: 05/20/2025         If you have any questions or concerns, please don't hesitate to call.      Mis Henao RN    ______________________________           _______________          _______________  Hospital Representative                              Date                                Time

## 2025-05-18 NOTE — ED PROVIDER NOTES
Time reflects when diagnosis was documented in both MDM as applicable and the Disposition within this note       Time User Action Codes Description Comment    5/18/2025  3:31 PM Lani Joyner Add [R07.9] Chest pain     5/18/2025  3:32 PM Lani Joyner Add [R10.9] Abdominal pain     5/18/2025  3:32 PM Lani Joyner Add [R11.0] Nausea           ED Disposition       ED Disposition   Discharge    Condition   Stable    Date/Time   Sun May 18, 2025  3:31 PM    Comment   Gómez Kim discharge to home/self care.                   Assessment & Plan       Medical Decision Making  Amount and/or Complexity of Data Reviewed  Labs: ordered. Decision-making details documented in ED Course.  Radiology: ordered and independent interpretation performed.    Risk  Prescription drug management.    Patient is a 39 y.o. female with PMH of gastric bypass who presents to the ED with chest pain, epigastric pain.    Vital signs stable. On exam well-appearing in no acute distress, epigastric tenderness to palpation without rebound or guarding.    History and physical exam most consistent with GERD. However, differential diagnosis included but not limited to peptic ulcer disease, ACS, pancreatitis, cholecystitis, pneumonia.  Given patient's history, concern for acute abdominal pathology including perforation.     Plan: CBC, CMP, lipase, EKG, chest x-ray, CT abdomen pelvis with oral and IV contrast    View ED course for further discussion on patient workup.     On review of previous records reviewed records from [facility] on 4/30/2025, patient evaluated for postop visit -had robotic assisted laparoscopic conversion from sleeve gastrectomy to Lino-en-Y gastric bypass on 2/28/2025.    All labs reviewed and utilized in the medical decision making process  All radiology studies independently viewed by me and interpreted by the radiologist.  I reviewed all testing with the patient.     Upon re-evaluation patient resting comfortably no acute  "distress, tolerated p.o.    Disposition: I have reviewed the patient's vital signs, nursing notes, and other relevant tests/information. I had a detailed discussion with the patient regarding the history, exam findings, and any diagnostic results.   Plan to discharge home in stable condition, follow up with GI, bariatric surgery.  Discussed with patient who is agreeable to plan.  I discussed discharge instructions, need for follow-up, and oral return precautions for what to return for in addition to the written return precautions and discharge instructions, specifically highlighting areas of special concern.  The patient verbalized understanding of the discharge instructions and warnings that would necessitate return to the Emergency Department including worsening pain, vomiting, inability to tolerate PO.  All questions the patient had were answered prior to discharge to the best of my ability.       Portions of the record may have been created with voice recognition software. Occasional wrong word or \"sound a like\" substitutions may have occurred due to the inherent limitations of voice recognition software. Read the chart carefully and recognize, using context, where substitutions have occurred.      ED Course as of 05/23/25 0902   Sun May 18, 2025   1016 Procedure Note: EKG  Date/Time: 05/18/25 10:16 AM   Interpreted by: Lani Joyner  Indications / Diagnosis: chest pain  ECG reviewed by me, the ED Provider: yes   The EKG demonstrates:  Rate: 81  Rhythm: NSR  Intervals: regular  Axis: regular  QRS/Blocks: regular  ST Changes: No acute ST Changes, no STD/KALLIE.  Compared to prior EKG on 7/28/22, no acute change.      1110 CBC and differential(!)  No acute leukocytosis or anemia   1139 PREGNANCY, SERUM: Negative   1139 LIPASE: 35  Low concern for acute pancreatitis   1140 hs TnI 0hr: <2  Low concern for ACS, symptoms present for several hours   1140 Comprehensive metabolic panel  Normal LFTs, normal renal function "   1510 No acute abnormality on CT        Medications   morphine injection 4 mg (4 mg Intravenous Given 25 1050)   ondansetron (ZOFRAN) injection 4 mg (4 mg Intravenous Given 25 1050)   iohexol (OMNIPAQUE) 240 MG/ML solution 50 mL (50 mL Oral Given 25 1153)   iohexol (OMNIPAQUE) 350 MG/ML injection (MULTI-DOSE) 75 mL (75 mL Intravenous Given 25 1337)       ED Risk Strat Scores   HEART Risk Score      Flowsheet Row Most Recent Value   Heart Score Risk Calculator    History 0 Filed at: 2025 1532   ECG 0 Filed at: 2025 1532   Age 0 Filed at: 2025 1532   Risk Factors 1 Filed at: 2025 1532   Troponin 0 Filed at: 2025 1532   HEART Score 1 Filed at: 2025 1532          HEART Risk Score      Flowsheet Row Most Recent Value   Heart Score Risk Calculator    History 0 Filed at: 2025 1532   ECG 0 Filed at: 2025 1532   Age 0 Filed at: 2025 1532   Risk Factors 1 Filed at: 2025 1532   Troponin 0 Filed at: 2025 1532   HEART Score 1 Filed at: 2025 1532                      No data recorded                            History of Present Illness       Chief Complaint   Patient presents with    Chest Pain     For the past few weeks has had CP on the left side, it hasn't changed since the start but she isnt any better, she spoke to her dr and they said to come in as she had a sleeve done 2025. + n/v        Past Medical History:   Diagnosis Date    Asthma     GERD (gastroesophageal reflux disease)     last assessed: 2014    Migraine     Pregnancy induced hypertension       Past Surgical History:   Procedure Laterality Date     SECTION      x 3    INDUCED       TUBAL LIGATION        Family History   Problem Relation Name Age of Onset    Asthma Mother      Migraines Mother      Osteoarthritis Mother      Cervical cancer Mother      Hypertension Mother      Multiple sclerosis Mother  60    Arthritis Father      Diabetes Father       No Known Problems Sister      No Known Problems Brother 1     Asthma Daughter 1     Asthma Son      Diabetes Maternal Grandmother      Heart failure Maternal Grandmother      Arthritis Maternal Grandmother        Social History[1]   E-Cigarette/Vaping    E-Cigarette Use Never User       E-Cigarette/Vaping Substances    Nicotine No     THC No     CBD No     Flavoring No     Other No     Unknown No       I have reviewed and agree with the history as documented.     HPI  Patient is a 39 y.o. female with history of gastric sleeve, converted to gastric bypass presenting to the emergency department for epigastric/chest pain. Patient states that she has been having left-sided chest pain and epigastric pain for the past several weeks.  States that she talked to her gastroenterologist and is scheduled to have an EGD done however given her history of bypass they advised her to come to the emergency department for evaluation.  She complains of having nausea, denies having any vomiting, fever.  Has been taking her medications including Prilosec as prescribed.  Denies any fevers, chills, urinary symptoms, difficulty breathing    Review of Systems   Constitutional:  Negative for fever.   HENT:  Negative for congestion.    Eyes:  Negative for visual disturbance.   Respiratory:  Negative for shortness of breath.    Cardiovascular:  Positive for chest pain.   Gastrointestinal:  Positive for abdominal pain. Negative for diarrhea and vomiting.   Endocrine: Negative for polyuria.   Genitourinary:  Negative for dysuria.   Musculoskeletal:  Negative for gait problem.   Skin:  Negative for rash.   Neurological:  Negative for dizziness.   All other systems reviewed and are negative.          Objective       ED Triage Vitals   Temperature Pulse Blood Pressure Respirations SpO2 Patient Position - Orthostatic VS   05/18/25 0946 05/18/25 0946 05/18/25 0946 05/18/25 0946 05/18/25 0946 05/18/25 1100   98 °F (36.7 °C) 86 94/53 18 98 % Lying       Temp src Heart Rate Source BP Location FiO2 (%) Pain Score    -- 05/18/25 1100 05/18/25 1100 -- 05/18/25 0946     Monitor Right arm  10 - Worst Possible Pain      Vitals      Date and Time Temp Pulse SpO2 Resp BP Pain Score FACES Pain Rating User   05/18/25 1430 -- 58 98 % 17 126/70 -- -- SD   05/18/25 1415 -- 64 98 % 14 -- -- -- SD   05/18/25 1400 -- 62 98 % 12 98/71 -- -- SD   05/18/25 1345 -- 72 99 % 20 107/59 -- -- SD   05/18/25 1230 -- 70 99 % 24 106/65 5 -- MD   05/18/25 1145 -- 76 99 % 18 -- -- -- SD   05/18/25 1130 -- 72 98 % 18 118/70 -- -- SD   05/18/25 1115 -- 78 100 % 23 -- -- -- SD   05/18/25 1100 -- 76 99 % 16 114/60 -- -- SD   05/18/25 1050 -- -- -- -- -- 8 -- SD   05/18/25 0946 98 °F (36.7 °C) 86 98 % 18 94/53 10 - Worst Possible Pain -- HK            Physical Exam  Vitals and nursing note reviewed.   Constitutional:       Appearance: Normal appearance.   HENT:      Head: Normocephalic and atraumatic.      Mouth/Throat:      Mouth: Mucous membranes are moist.     Eyes:      Conjunctiva/sclera: Conjunctivae normal.       Cardiovascular:      Rate and Rhythm: Normal rate and regular rhythm.      Pulses: Normal pulses.      Heart sounds: Normal heart sounds.   Pulmonary:      Effort: Pulmonary effort is normal.      Breath sounds: Normal breath sounds.   Abdominal:      Palpations: Abdomen is soft.      Comments: Epigastric tenderness to palpation without rebound or guarding     Musculoskeletal:         General: No tenderness.      Cervical back: Neck supple.      Right lower leg: No tenderness.      Left lower leg: No tenderness.     Skin:     General: Skin is warm and dry.      Capillary Refill: Capillary refill takes less than 2 seconds.     Neurological:      General: No focal deficit present.      Mental Status: She is alert. Mental status is at baseline.     Psychiatric:         Mood and Affect: Mood normal.         Results Reviewed       Procedure Component Value Units Date/Time    hCG,  qualitative pregnancy [[id number]]  (Normal) Collected: 05/18/25 1038    Lab Status: Final result Specimen: Blood from Arm, Left Updated: 05/18/25 1121     Preg, Serum Negative    Comprehensive metabolic panel [[id number]] Collected: 05/18/25 1038    Lab Status: Final result Specimen: Blood from Arm, Left Updated: 05/18/25 1115     Sodium 137 mmol/L      Potassium 4.3 mmol/L      Chloride 106 mmol/L      CO2 25 mmol/L      ANION GAP 6 mmol/L      BUN 10 mg/dL      Creatinine 0.68 mg/dL      Glucose 73 mg/dL      Calcium 8.7 mg/dL      AST 26 U/L      ALT 16 U/L      Alkaline Phosphatase 53 U/L      Total Protein 6.7 g/dL      Albumin 3.7 g/dL      Total Bilirubin 0.33 mg/dL      eGFR 111 ml/min/1.73sq m     Narrative:      National Kid[employer] guidelines for Chronic Kidney Disease (CKD):     Stage 1 with normal or high GFR (GFR > 90 mL/min/1.73 square meters)    Stage 2 Mild CKD (GFR = 60-89 mL/min/1.73 square meters)    Stage 3A Moderate CKD (GFR = 45-59 mL/min/1.73 square meters)    Stage 3B Moderate CKD (GFR = 30-44 mL/min/1.73 square meters)    Stage 4 Severe CKD (GFR = 15-29 mL/min/1.73 square meters)    Stage 5 End Stage CKD (GFR <15 mL/min/1.73 square meters)  Note: GFR calculation is accurate only with a steady state creatinine    Lipase [[id number]]  (Normal) Collected: 05/18/25 1038    Lab Status: Final result Specimen: Blood from Arm, Left Updated: 05/18/25 1115     Lipase 35 u/L     HS Troponin 0hr (reflex protocol) [[id number]]  (Normal) Collected: 05/18/25 1038    Lab Status: Final result Specimen: Blood from Arm, Left Updated: 05/18/25 1114     hs TnI 0hr <2 ng/L     CBC and differential [[id number]]  (Abnormal) Collected: 05/18/25 1038    Lab Status: Final result Specimen: Blood from Arm, Left Updated: 05/18/25 1054     WBC 10.12 Thousand/uL      RBC 3.83 Million/uL      Hemoglobin 12.6 g/dL      Hematocrit 38.3 %       fL      MCH 32.9 pg      MCHC 32.9 g/dL      RDW 14.0 %      MPV  9.0 fL      Platelets 301 Thousands/uL      nRBC 0 /100 WBCs      Segmented % 64 %      Immature Grans % 0 %      Lymphocytes % 28 %      Monocytes % 6 %      Eosinophils Relative 1 %      Basophils Relative 1 %      Absolute Neutrophils 6.55 Thousands/µL      Absolute Immature Grans 0.04 Thousand/uL      Absolute Lymphocytes 2.81 Thousands/µL      Absolute Monocytes 0.60 Thousand/µL      Eosinophils Absolute 0.07 Thousand/µL      Basophils Absolute 0.05 Thousands/µL             CT abdomen pelvis with contrast   Final Interpretation by Arnaldo Glover MD (05/18 3934)      No acute abdominopelvic abnormality.   Unremarkable gastric bypass as above         Workstation performed: [id number]         XR chest 2 views   ED Interpretation by Lani Joyner DO (05/18 1110)   No acute cardiopulmonary abnormality      Final Interpretation by Fannie Williamson MD (05/18 3544)      No acute cardiopulmonary disease.            Workstation performed: [id number]             Procedures    ED Medication and Procedure Management   Prior to Admission Medications   Prescriptions Last Dose Informant Patient Reported? Taking?   ALPRAZolam (XANAX) 0.25 mg tablet   Yes No   Sig: Take 0.25 mg by mouth   Tirzepatide-Weight Management (Zepbound) 5 MG/0.5ML SOLN   Yes No   Sig: Inject under the skin   ZADITOR 0.025 % ophthalmic solution   Yes No   Sig: instill 1 drop into both eyes twice a day if needed for ITCHY EYES FOR UP TO 90 DAYS   Patient not taking: Reported on 12/23/2024   albuterol (ProAir HFA) 90 mcg/act inhaler   No No   Sig: Inhale 2 puffs every 6 (six) hours as needed for shortness of breath or wheezing   brompheniramine-pseudoephedrine-DM 30-2-10 MG/5ML syrup   No No   Sig: Take 5 mL by mouth 4 (four) times a day as needed for allergies   cephalexin (KEFLEX) 500 mg capsule   Yes No   Sig: TAKE 1 CAPSULE BY MOUTH FOUR TIMES A DAY FOR 7 DAYS   Patient not taking: Reported on 12/23/2024   diclofenac (VOLTAREN) 75 mg EC  tablet   Yes No   Sig: Take 75 mg by mouth   famotidine (PEPCID) 40 MG tablet   Yes No   Sig: Take 40 mg by mouth daily   fluticasone (FLONASE) 50 mcg/act nasal spray   No No   Si spray into each nostril daily   Patient not taking: Reported on 2024   gabapentin (NEURONTIN) 300 mg capsule   Yes No   Sig: Take 300 mg by mouth   loperamide (IMODIUM) 2 mg capsule   No No   Sig: Take 1 capsule (2 mg total) by mouth 4 (four) times a day as needed for diarrhea   Patient not taking: Reported on 2024   methocarbamol (ROBAXIN) 500 mg tablet   Yes No   Sig: TAKE 1 TABLET BY MOUTH 4 TIMES A DAY AS NEEDED FOR MUSCLE SPASMS.   Patient not taking: Reported on 2024   montelukast (SINGULAIR) 10 mg tablet   Yes No   Sig: TAKE 1 TABLET BY MOUTH EVERY DAY AT NIGHT   Patient not taking: Reported on 2024   naproxen (NAPROSYN) 500 mg tablet   No No   Sig: Take 1 tablet (500 mg total) by mouth 2 (two) times a day with meals   Patient not taking: Reported on 2024   ranitidine (ZANTAC) 150 mg tablet   No No   Sig: Take 0.5 tablets (75 mg total) by mouth 2 (two) times a day as needed for heartburn for up to 30 days   sertraline (ZOLOFT) 25 mg tablet   Yes No   Sig: Take 25 mg by mouth daily      Facility-Administered Medications: None     Discharge Medication List as of 2025  3:37 PM        START taking these medications    Details   ondansetron (ZOFRAN-ODT) 4 mg disintegrating tablet Take 1 tablet (4 mg total) by mouth every 6 (six) hours as needed for nausea, Starting Sun 2025, Normal           CONTINUE these medications which have NOT CHANGED    Details   albuterol (ProAir HFA) 90 mcg/act inhaler Inhale 2 puffs every 6 (six) hours as needed for shortness of breath or wheezing, Starting Mon 2024, Normal      ALPRAZolam (XANAX) 0.25 mg tablet Take 0.25 mg by mouth, Starting 2022, Until Mon 8/15/2022 at 2359, Historical Med      brompheniramine-pseudoephedrine-DM 30-2-10 MG/5ML syrup  Take 5 mL by mouth 4 (four) times a day as needed for allergies, Starting Mon 12/23/2024, Normal      cephalexin (KEFLEX) 500 mg capsule TAKE 1 CAPSULE BY MOUTH FOUR TIMES A DAY FOR 7 DAYS, Historical Med      diclofenac (VOLTAREN) 75 mg EC tablet Take 75 mg by mouth, Starting Wed 5/18/2022, Until Thu 5/18/2023 at 2359, Historical Med      famotidine (PEPCID) 40 MG tablet Take 40 mg by mouth daily, Starting Wed 1/19/2022, Until Thu 1/19/2023, Historical Med      fluticasone (FLONASE) 50 mcg/act nasal spray 1 spray into each nostril daily, Starting Mon 7/18/2022, Normal      gabapentin (NEURONTIN) 300 mg capsule Take 300 mg by mouth, Starting Tue 7/5/2022, Historical Med      loperamide (IMODIUM) 2 mg capsule Take 1 capsule (2 mg total) by mouth 4 (four) times a day as needed for diarrhea, Starting Mon 7/18/2022, Normal      methocarbamol (ROBAXIN) 500 mg tablet TAKE 1 TABLET BY MOUTH 4 TIMES A DAY AS NEEDED FOR MUSCLE SPASMS., Historical Med      montelukast (SINGULAIR) 10 mg tablet TAKE 1 TABLET BY MOUTH EVERY DAY AT NIGHT, Historical Med      naproxen (NAPROSYN) 500 mg tablet Take 1 tablet (500 mg total) by mouth 2 (two) times a day with meals, Starting Mon 7/18/2022, Normal      ranitidine (ZANTAC) 150 mg tablet Take 0.5 tablets (75 mg total) by mouth 2 (two) times a day as needed for heartburn for up to 30 days, Starting Mon 3/11/2019, Until Mon 12/23/2024 at 2359, Normal      sertraline (ZOLOFT) 25 mg tablet Take 25 mg by mouth daily, Starting Wed 5/25/2022, Historical Med      Tirzepatide-Weight Management (Zepbound) 5 MG/0.5ML SOLN Inject under the skin, Historical Med      ZADITOR 0.025 % ophthalmic solution instill 1 drop into both eyes twice a day if needed for ITCHY EYES FOR UP TO 90 DAYS, Historical Med           No discharge procedures on file.  ED SEPSIS DOCUMENTATION   Time reflects when diagnosis was documented in both MDM as applicable and the Disposition within this note       Time User Action  Codes Description Comment    5/18/2025  3:31 PM Lani Joyner Add [R07.9] Chest pain     5/18/2025  3:32 PM Lani Joyner Add [R10.9] Abdominal pain     5/18/2025  3:32 PM Lani Joyner Add [R11.0] Nausea                    [1]   Social History  Tobacco Use    Smoking status: Never     Passive exposure: Never    Smokeless tobacco: Never   Vaping Use    Vaping status: Never Used   Substance Use Topics    Alcohol use: Not Currently     Comment: social    Drug use: Never        Lani Joyner DO  05/23/25 0902

## 2025-05-18 NOTE — ED ATTENDING ATTESTATION
5/18/2025  I, Jasbir Randhawa MD, saw and evaluated the patient. I have discussed the patient with the resident/non-physician practitioner and agree with the resident's/non-physician practitioner's findings, Plan of Care, and MDM as documented in the resident's/non-physician practitioner's note, except where noted. All available labs and Radiology studies were reviewed.  I was present for key portions of any procedure(s) performed by the resident/non-physician practitioner and I was immediately available to provide assistance.       At this point I agree with the current assessment done in the Emergency Department.  I have conducted an independent evaluation of this patient a history and physical is as follows:  Epigastric pain  nausea no vomiting  no fever   has loose stool no blood  on priolosec    is scheduled to have EGD   Gastric bypass  feb 2025       No sob  no leg pain or swelling   Exam the patient is in no acute distress she is well-appearing  walking in exam room talking on cell phone   vital signs are stable she is afebrile  sat 99 % RA   HEENT exam is unremarkable sclera anicteric mucous membranes are moist neck is supple no JVD lungs clear heart regular no murmurs  abdomen is soft positive bowel sounds tenderness in the epigastric area without rebound or guarding positive bowel sounds  extremities  are normal       Impression  abdominal pain  s/p gastric bypass  Pain control   ivf    CT a/p   ED Course         Critical Care Time  Procedures

## 2025-05-18 NOTE — DISCHARGE INSTRUCTIONS
You were seen in the emergency department today for abdominal pain / chest pain.    Your testing showed no acute abnormality on your CT.    Follow up with your primary care provider / GI doctor / surgeon.     Take your normal medications as prescribed    Return to the emergency department for any new or concerning symptoms including worsening pain, difficulty breathing, vomiting.     Thank you for choosing [facility] for your care today.